# Patient Record
Sex: FEMALE | Race: WHITE | Employment: OTHER | URBAN - NONMETROPOLITAN AREA
[De-identification: names, ages, dates, MRNs, and addresses within clinical notes are randomized per-mention and may not be internally consistent; named-entity substitution may affect disease eponyms.]

---

## 2018-05-08 ENCOUNTER — OFFICE VISIT (OUTPATIENT)
Dept: OBGYN | Age: 33
End: 2018-05-08
Payer: MEDICARE

## 2018-05-08 VITALS
HEIGHT: 67 IN | BODY MASS INDEX: 37.51 KG/M2 | DIASTOLIC BLOOD PRESSURE: 82 MMHG | SYSTOLIC BLOOD PRESSURE: 126 MMHG | HEART RATE: 108 BPM | WEIGHT: 239 LBS

## 2018-05-08 DIAGNOSIS — N92.6 IRREGULAR PERIODS: ICD-10-CM

## 2018-05-08 DIAGNOSIS — N94.6 DYSMENORRHEA: ICD-10-CM

## 2018-05-08 DIAGNOSIS — Z79.4 DIABETES MELLITUS DUE TO UNDERLYING CONDITION WITH BOTH EYES AFFECTED BY MILD NONPROLIFERATIVE RETINOPATHY AND MACULAR EDEMA, WITH LONG-TERM CURRENT USE OF INSULIN (HCC): ICD-10-CM

## 2018-05-08 DIAGNOSIS — E08.3213 DIABETES MELLITUS DUE TO UNDERLYING CONDITION WITH BOTH EYES AFFECTED BY MILD NONPROLIFERATIVE RETINOPATHY AND MACULAR EDEMA, WITH LONG-TERM CURRENT USE OF INSULIN (HCC): ICD-10-CM

## 2018-05-08 DIAGNOSIS — N92.1 EXCESSIVE AND FREQUENT MENSTRUATION WITH IRREGULAR CYCLE: ICD-10-CM

## 2018-05-08 DIAGNOSIS — Z11.51 SCREENING FOR HUMAN PAPILLOMAVIRUS (HPV): ICD-10-CM

## 2018-05-08 DIAGNOSIS — Z12.4 SCREENING FOR CERVICAL CANCER: ICD-10-CM

## 2018-05-08 DIAGNOSIS — Z01.419 ENCOUNTER FOR WELL WOMAN EXAM WITH ROUTINE GYNECOLOGICAL EXAM: Primary | ICD-10-CM

## 2018-05-08 PROCEDURE — G0101 CA SCREEN;PELVIC/BREAST EXAM: HCPCS | Performed by: NURSE PRACTITIONER

## 2018-05-08 ASSESSMENT — ENCOUNTER SYMPTOMS
GASTROINTESTINAL NEGATIVE: 1
RESPIRATORY NEGATIVE: 1
EYES NEGATIVE: 1
CONSTIPATION: 0
DIARRHEA: 0
ALLERGIC/IMMUNOLOGIC NEGATIVE: 1

## 2018-05-10 DIAGNOSIS — E08.3213 DIABETES MELLITUS DUE TO UNDERLYING CONDITION WITH BOTH EYES AFFECTED BY MILD NONPROLIFERATIVE RETINOPATHY AND MACULAR EDEMA, WITH LONG-TERM CURRENT USE OF INSULIN (HCC): ICD-10-CM

## 2018-05-10 DIAGNOSIS — Z79.4 DIABETES MELLITUS DUE TO UNDERLYING CONDITION WITH BOTH EYES AFFECTED BY MILD NONPROLIFERATIVE RETINOPATHY AND MACULAR EDEMA, WITH LONG-TERM CURRENT USE OF INSULIN (HCC): ICD-10-CM

## 2018-05-10 DIAGNOSIS — N92.1 EXCESSIVE AND FREQUENT MENSTRUATION WITH IRREGULAR CYCLE: ICD-10-CM

## 2018-05-11 DIAGNOSIS — Z01.419 ENCOUNTER FOR WELL WOMAN EXAM WITH ROUTINE GYNECOLOGICAL EXAM: ICD-10-CM

## 2018-05-11 DIAGNOSIS — Z11.51 SCREENING FOR HUMAN PAPILLOMAVIRUS (HPV): ICD-10-CM

## 2018-05-11 DIAGNOSIS — Z12.4 SCREENING FOR CERVICAL CANCER: ICD-10-CM

## 2018-05-14 RX ORDER — LEVOTHYROXINE SODIUM 0.05 MG/1
50 TABLET ORAL DAILY
Qty: 30 TABLET | Refills: 1 | Status: SHIPPED | OUTPATIENT
Start: 2018-05-14

## 2018-09-14 ENCOUNTER — HOSPITAL ENCOUNTER (OUTPATIENT)
Facility: HOSPITAL | Age: 33
Setting detail: OBSERVATION
Discharge: HOME | End: 2018-09-18
Attending: EMERGENCY MEDICINE | Admitting: EMERGENCY MEDICINE

## 2018-09-14 ENCOUNTER — APPOINTMENT (OUTPATIENT)
Dept: CT IMAGING | Facility: HOSPITAL | Age: 33
End: 2018-09-14
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
## 2018-09-14 DIAGNOSIS — R74.8 ELEVATED LIPASE: ICD-10-CM

## 2018-09-14 LAB
ABO GROUP BLD: NORMAL
ALBUMIN SERPL-MCNC: 4.3 G/DL (ref 3.5–5)
ALBUMIN/GLOB SERPL: 1.3 G/DL (ref 1.1–2.5)
ALP SERPL-CCNC: 83 U/L (ref 24–120)
ALT SERPL W P-5'-P-CCNC: 35 U/L (ref 0–54)
AMPHET+METHAMPHET UR QL: NEGATIVE
AMYLASE SERPL-CCNC: 103 U/L (ref 30–110)
ANION GAP SERPL CALCULATED.3IONS-SCNC: 13 MMOL/L (ref 4–13)
AST SERPL-CCNC: 27 U/L (ref 7–45)
BARBITURATES UR QL SCN: NEGATIVE
BASOPHILS # BLD AUTO: 0.09 10*3/MM3 (ref 0–0.2)
BASOPHILS NFR BLD AUTO: 0.8 % (ref 0–2)
BENZODIAZ UR QL SCN: NEGATIVE
BILIRUB SERPL-MCNC: 0.4 MG/DL (ref 0.1–1)
BILIRUB UR QL STRIP: NEGATIVE
BLD GP AB SCN SERPL QL: NEGATIVE
BUN BLD-MCNC: 12 MG/DL (ref 5–21)
BUN/CREAT SERPL: 16.7 (ref 7–25)
CALCIUM SPEC-SCNC: 9.3 MG/DL (ref 8.4–10.4)
CANNABINOIDS SERPL QL: NEGATIVE
CHLORIDE SERPL-SCNC: 105 MMOL/L (ref 98–110)
CLARITY UR: CLEAR
CO2 SERPL-SCNC: 20 MMOL/L (ref 24–31)
COCAINE UR QL: NEGATIVE
COLOR UR: YELLOW
CREAT BLD-MCNC: 0.72 MG/DL (ref 0.5–1.4)
DEPRECATED RDW RBC AUTO: 40.2 FL (ref 40–54)
EOSINOPHIL # BLD AUTO: 0.16 10*3/MM3 (ref 0–0.7)
EOSINOPHIL NFR BLD AUTO: 1.4 % (ref 0–4)
ERYTHROCYTE [DISTWIDTH] IN BLOOD BY AUTOMATED COUNT: 12.8 % (ref 12–15)
ETHANOL UR QL: <0.01 %
GFR SERPL CREATININE-BSD FRML MDRD: 94 ML/MIN/1.73
GLOBULIN UR ELPH-MCNC: 3.2 GM/DL
GLUCOSE BLD-MCNC: 403 MG/DL (ref 70–100)
GLUCOSE BLDC GLUCOMTR-MCNC: 253 MG/DL (ref 70–130)
GLUCOSE UR STRIP-MCNC: ABNORMAL MG/DL
HCG SERPL QL: NEGATIVE
HCT VFR BLD AUTO: 41.1 % (ref 37–47)
HGB BLD-MCNC: 14.2 G/DL (ref 12–16)
HGB UR QL STRIP.AUTO: NEGATIVE
HOLD SPECIMEN: NORMAL
HOLD SPECIMEN: NORMAL
IMM GRANULOCYTES # BLD: 0.04 10*3/MM3 (ref 0–0.03)
IMM GRANULOCYTES NFR BLD: 0.3 % (ref 0–5)
INR PPP: 0.95 (ref 0.91–1.09)
KETONES UR QL STRIP: NEGATIVE
LEUKOCYTE ESTERASE UR QL STRIP.AUTO: NEGATIVE
LIPASE SERPL-CCNC: 899 U/L (ref 23–203)
LYMPHOCYTES # BLD AUTO: 2.58 10*3/MM3 (ref 0.72–4.86)
LYMPHOCYTES NFR BLD AUTO: 22.1 % (ref 15–45)
MCH RBC QN AUTO: 30 PG (ref 28–32)
MCHC RBC AUTO-ENTMCNC: 34.5 G/DL (ref 33–36)
MCV RBC AUTO: 86.7 FL (ref 82–98)
METHADONE UR QL SCN: NEGATIVE
MONOCYTES # BLD AUTO: 0.89 10*3/MM3 (ref 0.19–1.3)
MONOCYTES NFR BLD AUTO: 7.6 % (ref 4–12)
NEUTROPHILS # BLD AUTO: 7.91 10*3/MM3 (ref 1.87–8.4)
NEUTROPHILS NFR BLD AUTO: 67.8 % (ref 39–78)
NITRITE UR QL STRIP: NEGATIVE
NRBC BLD MANUAL-RTO: 0 /100 WBC (ref 0–0)
OPIATES UR QL: NEGATIVE
PCP UR QL SCN: NEGATIVE
PH UR STRIP.AUTO: 6.5 [PH] (ref 5–8)
PLATELET # BLD AUTO: 385 10*3/MM3 (ref 130–400)
PMV BLD AUTO: 10.3 FL (ref 6–12)
POTASSIUM BLD-SCNC: 4.1 MMOL/L (ref 3.5–5.3)
PROT SERPL-MCNC: 7.5 G/DL (ref 6.3–8.7)
PROT UR QL STRIP: NEGATIVE
PROTHROMBIN TIME: 13 SECONDS (ref 11.9–14.6)
RBC # BLD AUTO: 4.74 10*6/MM3 (ref 4.2–5.4)
RH BLD: POSITIVE
SODIUM BLD-SCNC: 138 MMOL/L (ref 135–145)
SP GR UR STRIP: >1.03 (ref 1–1.03)
T&S EXPIRATION DATE: NORMAL
UROBILINOGEN UR QL STRIP: ABNORMAL
WBC NRBC COR # BLD: 11.67 10*3/MM3 (ref 4.8–10.8)
WHOLE BLOOD HOLD SPECIMEN: NORMAL
WHOLE BLOOD HOLD SPECIMEN: NORMAL

## 2018-09-14 PROCEDURE — 80307 DRUG TEST PRSMV CHEM ANLYZR: CPT | Performed by: EMERGENCY MEDICINE

## 2018-09-14 PROCEDURE — 71250 CT THORAX DX C-: CPT

## 2018-09-14 PROCEDURE — 93010 ELECTROCARDIOGRAM REPORT: CPT | Performed by: INTERNAL MEDICINE

## 2018-09-14 PROCEDURE — 96361 HYDRATE IV INFUSION ADD-ON: CPT

## 2018-09-14 PROCEDURE — 82150 ASSAY OF AMYLASE: CPT | Performed by: EMERGENCY MEDICINE

## 2018-09-14 PROCEDURE — 63710000001 INSULIN LISPRO (HUMAN) PER 5 UNITS: Performed by: SPECIALIST

## 2018-09-14 PROCEDURE — G0378 HOSPITAL OBSERVATION PER HR: HCPCS

## 2018-09-14 PROCEDURE — 82962 GLUCOSE BLOOD TEST: CPT

## 2018-09-14 PROCEDURE — 80053 COMPREHEN METABOLIC PANEL: CPT | Performed by: EMERGENCY MEDICINE

## 2018-09-14 PROCEDURE — 70450 CT HEAD/BRAIN W/O DYE: CPT

## 2018-09-14 PROCEDURE — 99285 EMERGENCY DEPT VISIT HI MDM: CPT

## 2018-09-14 PROCEDURE — 83690 ASSAY OF LIPASE: CPT | Performed by: EMERGENCY MEDICINE

## 2018-09-14 PROCEDURE — 84703 CHORIONIC GONADOTROPIN ASSAY: CPT | Performed by: EMERGENCY MEDICINE

## 2018-09-14 PROCEDURE — 74176 CT ABD & PELVIS W/O CONTRAST: CPT

## 2018-09-14 PROCEDURE — 25010000002 MORPHINE SULFATE (PF) 2 MG/ML SOLUTION: Performed by: EMERGENCY MEDICINE

## 2018-09-14 PROCEDURE — 86850 RBC ANTIBODY SCREEN: CPT | Performed by: EMERGENCY MEDICINE

## 2018-09-14 PROCEDURE — 25010000002 KETOROLAC TROMETHAMINE PER 15 MG: Performed by: EMERGENCY MEDICINE

## 2018-09-14 PROCEDURE — 85610 PROTHROMBIN TIME: CPT | Performed by: EMERGENCY MEDICINE

## 2018-09-14 PROCEDURE — 96375 TX/PRO/DX INJ NEW DRUG ADDON: CPT

## 2018-09-14 PROCEDURE — 86901 BLOOD TYPING SEROLOGIC RH(D): CPT | Performed by: EMERGENCY MEDICINE

## 2018-09-14 PROCEDURE — 72125 CT NECK SPINE W/O DYE: CPT

## 2018-09-14 PROCEDURE — 96374 THER/PROPH/DIAG INJ IV PUSH: CPT

## 2018-09-14 PROCEDURE — 93005 ELECTROCARDIOGRAM TRACING: CPT | Performed by: EMERGENCY MEDICINE

## 2018-09-14 PROCEDURE — 81003 URINALYSIS AUTO W/O SCOPE: CPT | Performed by: EMERGENCY MEDICINE

## 2018-09-14 PROCEDURE — 85025 COMPLETE CBC W/AUTO DIFF WBC: CPT | Performed by: EMERGENCY MEDICINE

## 2018-09-14 PROCEDURE — 86900 BLOOD TYPING SEROLOGIC ABO: CPT | Performed by: EMERGENCY MEDICINE

## 2018-09-14 RX ORDER — MORPHINE SULFATE 2 MG/ML
2 INJECTION, SOLUTION INTRAMUSCULAR; INTRAVENOUS ONCE
Status: COMPLETED | OUTPATIENT
Start: 2018-09-14 | End: 2018-09-14

## 2018-09-14 RX ORDER — SODIUM CHLORIDE 0.9 % (FLUSH) 0.9 %
10 SYRINGE (ML) INJECTION AS NEEDED
Status: DISCONTINUED | OUTPATIENT
Start: 2018-09-14 | End: 2018-09-18 | Stop reason: HOSPADM

## 2018-09-14 RX ORDER — NICOTINE POLACRILEX 4 MG
15 LOZENGE BUCCAL
Status: DISCONTINUED | OUTPATIENT
Start: 2018-09-14 | End: 2018-09-18 | Stop reason: HOSPADM

## 2018-09-14 RX ORDER — SODIUM CHLORIDE, SODIUM LACTATE, POTASSIUM CHLORIDE, CALCIUM CHLORIDE 600; 310; 30; 20 MG/100ML; MG/100ML; MG/100ML; MG/100ML
125 INJECTION, SOLUTION INTRAVENOUS CONTINUOUS
Status: DISCONTINUED | OUTPATIENT
Start: 2018-09-14 | End: 2018-09-18 | Stop reason: HOSPADM

## 2018-09-14 RX ORDER — DEXTROSE MONOHYDRATE 25 G/50ML
25 INJECTION, SOLUTION INTRAVENOUS
Status: DISCONTINUED | OUTPATIENT
Start: 2018-09-14 | End: 2018-09-18 | Stop reason: HOSPADM

## 2018-09-14 RX ORDER — KETOROLAC TROMETHAMINE 30 MG/ML
30 INJECTION, SOLUTION INTRAMUSCULAR; INTRAVENOUS ONCE
Status: COMPLETED | OUTPATIENT
Start: 2018-09-14 | End: 2018-09-14

## 2018-09-14 RX ORDER — SODIUM CHLORIDE 9 MG/ML
125 INJECTION, SOLUTION INTRAVENOUS CONTINUOUS
Status: DISCONTINUED | OUTPATIENT
Start: 2018-09-14 | End: 2018-09-14

## 2018-09-14 RX ORDER — ACETAMINOPHEN 500 MG
1000 TABLET ORAL ONCE
Status: COMPLETED | OUTPATIENT
Start: 2018-09-14 | End: 2018-09-14

## 2018-09-14 RX ORDER — OXYCODONE HYDROCHLORIDE 5 MG/1
5 TABLET ORAL EVERY 4 HOURS PRN
Status: DISCONTINUED | OUTPATIENT
Start: 2018-09-14 | End: 2018-09-15

## 2018-09-14 RX ADMIN — OXYCODONE HYDROCHLORIDE 5 MG: 5 TABLET ORAL at 20:03

## 2018-09-14 RX ADMIN — KETOROLAC TROMETHAMINE 30 MG: 30 INJECTION, SOLUTION INTRAMUSCULAR at 19:42

## 2018-09-14 RX ADMIN — ACETAMINOPHEN 1000 MG: 500 TABLET, FILM COATED ORAL at 19:41

## 2018-09-14 RX ADMIN — SODIUM CHLORIDE, POTASSIUM CHLORIDE, SODIUM LACTATE AND CALCIUM CHLORIDE 125 ML/HR: 600; 310; 30; 20 INJECTION, SOLUTION INTRAVENOUS at 22:27

## 2018-09-14 RX ADMIN — INSULIN LISPRO 6 UNITS: 100 INJECTION, SOLUTION INTRAVENOUS; SUBCUTANEOUS at 22:38

## 2018-09-14 RX ADMIN — MORPHINE SULFATE 2 MG: 2 INJECTION, SOLUTION INTRAMUSCULAR; INTRAVENOUS at 17:52
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
## 2018-09-15 ENCOUNTER — APPOINTMENT (OUTPATIENT)
Dept: MRI IMAGING | Facility: HOSPITAL | Age: 33
End: 2018-09-15

## 2018-09-15 LAB
ALBUMIN SERPL-MCNC: 3.5 G/DL (ref 3.5–5)
ALBUMIN/GLOB SERPL: 1.3 G/DL (ref 1.1–2.5)
ALP SERPL-CCNC: 65 U/L (ref 24–120)
ALT SERPL W P-5'-P-CCNC: 35 U/L (ref 0–54)
AMYLASE SERPL-CCNC: 75 U/L (ref 30–110)
ANION GAP SERPL CALCULATED.3IONS-SCNC: 11 MMOL/L (ref 4–13)
AST SERPL-CCNC: 21 U/L (ref 7–45)
BILIRUB SERPL-MCNC: 0.3 MG/DL (ref 0.1–1)
BUN BLD-MCNC: 13 MG/DL (ref 5–21)
BUN/CREAT SERPL: 22.4 (ref 7–25)
CALCIUM SPEC-SCNC: 8.4 MG/DL (ref 8.4–10.4)
CHLORIDE SERPL-SCNC: 108 MMOL/L (ref 98–110)
CO2 SERPL-SCNC: 21 MMOL/L (ref 24–31)
CREAT BLD-MCNC: 0.58 MG/DL (ref 0.5–1.4)
DEPRECATED RDW RBC AUTO: 42 FL (ref 40–54)
ERYTHROCYTE [DISTWIDTH] IN BLOOD BY AUTOMATED COUNT: 12.8 % (ref 12–15)
GFR SERPL CREATININE-BSD FRML MDRD: 120 ML/MIN/1.73
GLOBULIN UR ELPH-MCNC: 2.8 GM/DL
GLUCOSE BLD-MCNC: 166 MG/DL (ref 70–100)
GLUCOSE BLDC GLUCOMTR-MCNC: 154 MG/DL (ref 70–130)
GLUCOSE BLDC GLUCOMTR-MCNC: 167 MG/DL (ref 70–130)
GLUCOSE BLDC GLUCOMTR-MCNC: 235 MG/DL (ref 70–130)
GLUCOSE BLDC GLUCOMTR-MCNC: 263 MG/DL (ref 70–130)
HCT VFR BLD AUTO: 37.9 % (ref 37–47)
HGB BLD-MCNC: 13 G/DL (ref 12–16)
LIPASE SERPL-CCNC: 445 U/L (ref 23–203)
MCH RBC QN AUTO: 30.7 PG (ref 28–32)
MCHC RBC AUTO-ENTMCNC: 34.3 G/DL (ref 33–36)
MCV RBC AUTO: 89.6 FL (ref 82–98)
PLATELET # BLD AUTO: 283 10*3/MM3 (ref 130–400)
PMV BLD AUTO: 10.2 FL (ref 6–12)
POTASSIUM BLD-SCNC: 3.9 MMOL/L (ref 3.5–5.3)
PROT SERPL-MCNC: 6.3 G/DL (ref 6.3–8.7)
RBC # BLD AUTO: 4.23 10*6/MM3 (ref 4.2–5.4)
SODIUM BLD-SCNC: 140 MMOL/L (ref 135–145)
WBC NRBC COR # BLD: 10.67 10*3/MM3 (ref 4.8–10.8)

## 2018-09-15 PROCEDURE — 80053 COMPREHEN METABOLIC PANEL: CPT | Performed by: SPECIALIST

## 2018-09-15 PROCEDURE — 94760 N-INVAS EAR/PLS OXIMETRY 1: CPT

## 2018-09-15 PROCEDURE — 94799 UNLISTED PULMONARY SVC/PX: CPT

## 2018-09-15 PROCEDURE — 72141 MRI NECK SPINE W/O DYE: CPT

## 2018-09-15 PROCEDURE — G0378 HOSPITAL OBSERVATION PER HR: HCPCS

## 2018-09-15 PROCEDURE — 96361 HYDRATE IV INFUSION ADD-ON: CPT

## 2018-09-15 PROCEDURE — 63710000001 INSULIN LISPRO (HUMAN) PER 5 UNITS: Performed by: SPECIALIST

## 2018-09-15 PROCEDURE — 82150 ASSAY OF AMYLASE: CPT | Performed by: SPECIALIST

## 2018-09-15 PROCEDURE — 85027 COMPLETE CBC AUTOMATED: CPT | Performed by: SPECIALIST

## 2018-09-15 PROCEDURE — 25010000002 MORPHINE PER 10 MG: Performed by: SPECIALIST

## 2018-09-15 PROCEDURE — 25010000002 KETOROLAC TROMETHAMINE PER 15 MG: Performed by: SPECIALIST

## 2018-09-15 PROCEDURE — 96375 TX/PRO/DX INJ NEW DRUG ADDON: CPT

## 2018-09-15 PROCEDURE — 82962 GLUCOSE BLOOD TEST: CPT

## 2018-09-15 PROCEDURE — 96376 TX/PRO/DX INJ SAME DRUG ADON: CPT

## 2018-09-15 PROCEDURE — 25010000002 ONDANSETRON PER 1 MG: Performed by: SPECIALIST

## 2018-09-15 PROCEDURE — 83690 ASSAY OF LIPASE: CPT | Performed by: SPECIALIST

## 2018-09-15 RX ORDER — ONDANSETRON 2 MG/ML
4 INJECTION INTRAMUSCULAR; INTRAVENOUS EVERY 6 HOURS PRN
Status: DISCONTINUED | OUTPATIENT
Start: 2018-09-15 | End: 2018-09-15

## 2018-09-15 RX ORDER — ONDANSETRON 2 MG/ML
4 INJECTION INTRAMUSCULAR; INTRAVENOUS EVERY 4 HOURS PRN
Status: DISCONTINUED | OUTPATIENT
Start: 2018-09-15 | End: 2018-09-18 | Stop reason: HOSPADM

## 2018-09-15 RX ORDER — DOCUSATE SODIUM 100 MG/1
100 CAPSULE, LIQUID FILLED ORAL 2 TIMES DAILY
Status: DISCONTINUED | OUTPATIENT
Start: 2018-09-15 | End: 2018-09-18 | Stop reason: HOSPADM

## 2018-09-15 RX ORDER — KETOROLAC TROMETHAMINE 30 MG/ML
30 INJECTION, SOLUTION INTRAMUSCULAR; INTRAVENOUS EVERY 6 HOURS PRN
Status: DISCONTINUED | OUTPATIENT
Start: 2018-09-15 | End: 2018-09-18 | Stop reason: HOSPADM

## 2018-09-15 RX ORDER — SODIUM CHLORIDE, SODIUM LACTATE, POTASSIUM CHLORIDE, CALCIUM CHLORIDE 600; 310; 30; 20 MG/100ML; MG/100ML; MG/100ML; MG/100ML
125 INJECTION, SOLUTION INTRAVENOUS CONTINUOUS
Status: DISCONTINUED | OUTPATIENT
Start: 2018-09-15 | End: 2018-09-15

## 2018-09-15 RX ADMIN — OXYCODONE HYDROCHLORIDE 5 MG: 5 TABLET ORAL at 04:57

## 2018-09-15 RX ADMIN — ONDANSETRON HYDROCHLORIDE 4 MG: 2 INJECTION, SOLUTION INTRAMUSCULAR; INTRAVENOUS at 12:21

## 2018-09-15 RX ADMIN — INSULIN LISPRO 4 UNITS: 100 INJECTION, SOLUTION INTRAVENOUS; SUBCUTANEOUS at 12:21

## 2018-09-15 RX ADMIN — INSULIN LISPRO 4 UNITS: 100 INJECTION, SOLUTION INTRAVENOUS; SUBCUTANEOUS at 08:12

## 2018-09-15 RX ADMIN — MORPHINE SULFATE 4 MG: 4 INJECTION INTRAVENOUS at 19:50

## 2018-09-15 RX ADMIN — ONDANSETRON HYDROCHLORIDE 4 MG: 2 INJECTION, SOLUTION INTRAMUSCULAR; INTRAVENOUS at 19:50

## 2018-09-15 RX ADMIN — DOCUSATE SODIUM 100 MG: 100 CAPSULE ORAL at 20:53

## 2018-09-15 RX ADMIN — INSULIN LISPRO 2 UNITS: 100 INJECTION, SOLUTION INTRAVENOUS; SUBCUTANEOUS at 19:22

## 2018-09-15 RX ADMIN — SODIUM CHLORIDE, POTASSIUM CHLORIDE, SODIUM LACTATE AND CALCIUM CHLORIDE 125 ML/HR: 600; 310; 30; 20 INJECTION, SOLUTION INTRAVENOUS at 06:42

## 2018-09-15 RX ADMIN — MORPHINE SULFATE 4 MG: 4 INJECTION INTRAVENOUS at 08:12

## 2018-09-15 RX ADMIN — KETOROLAC TROMETHAMINE 30 MG: 30 INJECTION, SOLUTION INTRAMUSCULAR; INTRAVENOUS at 12:21

## 2018-09-15 RX ADMIN — KETOROLAC TROMETHAMINE 30 MG: 30 INJECTION, SOLUTION INTRAMUSCULAR; INTRAVENOUS at 19:50

## 2018-09-15 RX ADMIN — SODIUM CHLORIDE, POTASSIUM CHLORIDE, SODIUM LACTATE AND CALCIUM CHLORIDE 125 ML/HR: 600; 310; 30; 20 INJECTION, SOLUTION INTRAVENOUS at 16:25

## 2018-09-15 RX ADMIN — DOCUSATE SODIUM 100 MG: 100 CAPSULE ORAL at 14:02

## 2018-09-15 RX ADMIN — INSULIN LISPRO 2 UNITS: 100 INJECTION, SOLUTION INTRAVENOUS; SUBCUTANEOUS at 21:00

## 2018-09-15 RX ADMIN — MORPHINE SULFATE 4 MG: 4 INJECTION INTRAVENOUS at 12:21
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
## 2018-09-15 NOTE — PLAN OF CARE
Problem: Patient Care Overview  Goal: Individualization and Mutuality  Outcome: Ongoing (interventions implemented as appropriate)   09/15/18 1727   Individualization   Patient Specific Preferences Likes to go to smoke.   Patient Specific Goals (Include Timeframe) Pain controlled with po pain med.   Patient Specific Interventions PO pain med controls pain.    Mutuality/Individual Preferences   What Anxieties, Fears, Concerns, or Questions Do You Have About Your Care? Concerned about pain in her neck   What Information Would Help Us Give You More Personalized Care? Pt has Devic's disease and has fallen at home prior to admission   How Would You and/or Your Support Person Like to Participate in Your Care? Keep pt and  informed of plan of care.   Mutuality/Individual Preferences   How to Address Anxieties/Fears Pt to use soft collar to help protect neck     Goal: Discharge Needs Assessment  Outcome: Ongoing (interventions implemented as appropriate)   09/14/18 2124 09/14/18 2128   Disability   Equipment Currently Used at Home glucometer  (uses walker and wheelchair when in a MS flare) --    Discharge Needs Assessment   Patient/Family Anticipates Transition to --  home with family   Patient/Family Anticipated Services at Transition --  none   Transportation Anticipated --  family or friend will provide       Problem: Fall Risk (Adult)  Goal: Identify Related Risk Factors and Signs and Symptoms  Outcome: Ongoing (interventions implemented as appropriate)   09/15/18 1727   Fall Risk (Adult)   Related Risk Factors (Fall Risk) fear of falling;history of falls;neuro disease/injury;environment unfamiliar   Signs and Symptoms (Fall Risk) presence of risk factors     Goal: Absence of Fall  Outcome: Ongoing (interventions implemented as appropriate)   09/15/18 1727   Fall Risk (Adult)   Absence of Fall making progress toward outcome

## 2018-09-15 NOTE — PLAN OF CARE
Problem: Pain, Acute (Adult)  Goal: Identify Related Risk Factors and Signs and Symptoms  Outcome: Ongoing (interventions implemented as appropriate)    Goal: Acceptable Pain Control/Comfort Level  Outcome: Ongoing (interventions implemented as appropriate)   09/15/18 7390   Pain, Acute (Adult)   Acceptable Pain Control/Comfort Level making progress toward outcome

## 2018-09-16 LAB
ALBUMIN SERPL-MCNC: 3.4 G/DL (ref 3.5–5)
ALBUMIN/GLOB SERPL: 1.3 G/DL (ref 1.1–2.5)
ALP SERPL-CCNC: 68 U/L (ref 24–120)
ALT SERPL W P-5'-P-CCNC: 36 U/L (ref 0–54)
AMYLASE SERPL-CCNC: 88 U/L (ref 30–110)
ANION GAP SERPL CALCULATED.3IONS-SCNC: 7 MMOL/L (ref 4–13)
AST SERPL-CCNC: 22 U/L (ref 7–45)
BILIRUB SERPL-MCNC: 0.4 MG/DL (ref 0.1–1)
BUN BLD-MCNC: 15 MG/DL (ref 5–21)
BUN/CREAT SERPL: 25.9 (ref 7–25)
CALCIUM SPEC-SCNC: 8.1 MG/DL (ref 8.4–10.4)
CHLORIDE SERPL-SCNC: 107 MMOL/L (ref 98–110)
CO2 SERPL-SCNC: 24 MMOL/L (ref 24–31)
CREAT BLD-MCNC: 0.58 MG/DL (ref 0.5–1.4)
DEPRECATED RDW RBC AUTO: 41.8 FL (ref 40–54)
ERYTHROCYTE [DISTWIDTH] IN BLOOD BY AUTOMATED COUNT: 12.8 % (ref 12–15)
GFR SERPL CREATININE-BSD FRML MDRD: 120 ML/MIN/1.73
GLOBULIN UR ELPH-MCNC: 2.7 GM/DL
GLUCOSE BLD-MCNC: 158 MG/DL (ref 70–100)
GLUCOSE BLDC GLUCOMTR-MCNC: 121 MG/DL (ref 70–130)
GLUCOSE BLDC GLUCOMTR-MCNC: 151 MG/DL (ref 70–130)
GLUCOSE BLDC GLUCOMTR-MCNC: 191 MG/DL (ref 70–130)
GLUCOSE BLDC GLUCOMTR-MCNC: 242 MG/DL (ref 70–130)
HCT VFR BLD AUTO: 37.2 % (ref 37–47)
HGB BLD-MCNC: 12.7 G/DL (ref 12–16)
LIPASE SERPL-CCNC: 375 U/L (ref 23–203)
MCH RBC QN AUTO: 30.6 PG (ref 28–32)
MCHC RBC AUTO-ENTMCNC: 34.1 G/DL (ref 33–36)
MCV RBC AUTO: 89.6 FL (ref 82–98)
PLATELET # BLD AUTO: 274 10*3/MM3 (ref 130–400)
PMV BLD AUTO: 10.1 FL (ref 6–12)
POTASSIUM BLD-SCNC: 3.6 MMOL/L (ref 3.5–5.3)
PROT SERPL-MCNC: 6.1 G/DL (ref 6.3–8.7)
RBC # BLD AUTO: 4.15 10*6/MM3 (ref 4.2–5.4)
SODIUM BLD-SCNC: 138 MMOL/L (ref 135–145)
WBC NRBC COR # BLD: 11.96 10*3/MM3 (ref 4.8–10.8)

## 2018-09-16 PROCEDURE — 85027 COMPLETE CBC AUTOMATED: CPT | Performed by: SPECIALIST

## 2018-09-16 PROCEDURE — 25010000002 ONDANSETRON PER 1 MG: Performed by: SPECIALIST

## 2018-09-16 PROCEDURE — 80053 COMPREHEN METABOLIC PANEL: CPT | Performed by: SPECIALIST

## 2018-09-16 PROCEDURE — 25010000002 ENOXAPARIN PER 10 MG: Performed by: SPECIALIST

## 2018-09-16 PROCEDURE — 96361 HYDRATE IV INFUSION ADD-ON: CPT

## 2018-09-16 PROCEDURE — 96372 THER/PROPH/DIAG INJ SC/IM: CPT

## 2018-09-16 PROCEDURE — G0378 HOSPITAL OBSERVATION PER HR: HCPCS

## 2018-09-16 PROCEDURE — 25010000002 MORPHINE PER 10 MG: Performed by: SPECIALIST

## 2018-09-16 PROCEDURE — 63710000001 INSULIN LISPRO (HUMAN) PER 5 UNITS: Performed by: SPECIALIST

## 2018-09-16 PROCEDURE — 82962 GLUCOSE BLOOD TEST: CPT

## 2018-09-16 PROCEDURE — 96376 TX/PRO/DX INJ SAME DRUG ADON: CPT

## 2018-09-16 PROCEDURE — 83690 ASSAY OF LIPASE: CPT | Performed by: SPECIALIST

## 2018-09-16 PROCEDURE — 25010000002 KETOROLAC TROMETHAMINE PER 15 MG: Performed by: SPECIALIST

## 2018-09-16 PROCEDURE — 82150 ASSAY OF AMYLASE: CPT | Performed by: SPECIALIST

## 2018-09-16 RX ADMIN — DOCUSATE SODIUM 100 MG: 100 CAPSULE ORAL at 08:35

## 2018-09-16 RX ADMIN — ENOXAPARIN SODIUM 40 MG: 40 INJECTION SUBCUTANEOUS at 08:36

## 2018-09-16 RX ADMIN — ONDANSETRON HYDROCHLORIDE 4 MG: 2 INJECTION, SOLUTION INTRAMUSCULAR; INTRAVENOUS at 23:40

## 2018-09-16 RX ADMIN — INSULIN LISPRO 4 UNITS: 100 INJECTION, SOLUTION INTRAVENOUS; SUBCUTANEOUS at 17:05

## 2018-09-16 RX ADMIN — INSULIN LISPRO 2 UNITS: 100 INJECTION, SOLUTION INTRAVENOUS; SUBCUTANEOUS at 12:08

## 2018-09-16 RX ADMIN — DOCUSATE SODIUM 100 MG: 100 CAPSULE ORAL at 21:41

## 2018-09-16 RX ADMIN — KETOROLAC TROMETHAMINE 30 MG: 30 INJECTION, SOLUTION INTRAMUSCULAR; INTRAVENOUS at 12:09

## 2018-09-16 RX ADMIN — ONDANSETRON HYDROCHLORIDE 4 MG: 2 INJECTION, SOLUTION INTRAMUSCULAR; INTRAVENOUS at 00:43

## 2018-09-16 RX ADMIN — MORPHINE SULFATE 4 MG: 4 INJECTION INTRAVENOUS at 16:54

## 2018-09-16 RX ADMIN — SODIUM CHLORIDE, POTASSIUM CHLORIDE, SODIUM LACTATE AND CALCIUM CHLORIDE 125 ML/HR: 600; 310; 30; 20 INJECTION, SOLUTION INTRAVENOUS at 08:37

## 2018-09-16 RX ADMIN — ONDANSETRON HYDROCHLORIDE 4 MG: 2 INJECTION, SOLUTION INTRAMUSCULAR; INTRAVENOUS at 05:34

## 2018-09-16 RX ADMIN — INSULIN LISPRO 2 UNITS: 100 INJECTION, SOLUTION INTRAVENOUS; SUBCUTANEOUS at 08:39

## 2018-09-16 RX ADMIN — ONDANSETRON HYDROCHLORIDE 4 MG: 2 INJECTION, SOLUTION INTRAMUSCULAR; INTRAVENOUS at 16:54

## 2018-09-16 RX ADMIN — MORPHINE SULFATE 4 MG: 4 INJECTION INTRAVENOUS at 00:43

## 2018-09-16 RX ADMIN — KETOROLAC TROMETHAMINE 30 MG: 30 INJECTION, SOLUTION INTRAMUSCULAR; INTRAVENOUS at 21:48

## 2018-09-16 RX ADMIN — MORPHINE SULFATE 4 MG: 4 INJECTION INTRAVENOUS at 19:37

## 2018-09-16 RX ADMIN — MORPHINE SULFATE 4 MG: 4 INJECTION INTRAVENOUS at 12:09

## 2018-09-16 RX ADMIN — SODIUM CHLORIDE, POTASSIUM CHLORIDE, SODIUM LACTATE AND CALCIUM CHLORIDE 125 ML/HR: 600; 310; 30; 20 INJECTION, SOLUTION INTRAVENOUS at 00:42

## 2018-09-16 RX ADMIN — MORPHINE SULFATE 4 MG: 4 INJECTION INTRAVENOUS at 05:34

## 2018-09-16 RX ADMIN — ONDANSETRON HYDROCHLORIDE 4 MG: 2 INJECTION, SOLUTION INTRAMUSCULAR; INTRAVENOUS at 19:37

## 2018-09-16 RX ADMIN — KETOROLAC TROMETHAMINE 30 MG: 30 INJECTION, SOLUTION INTRAMUSCULAR; INTRAVENOUS at 05:33

## 2018-09-16 RX ADMIN — SODIUM CHLORIDE, POTASSIUM CHLORIDE, SODIUM LACTATE AND CALCIUM CHLORIDE 125 ML/HR: 600; 310; 30; 20 INJECTION, SOLUTION INTRAVENOUS at 17:05

## 2018-09-16 RX ADMIN — MORPHINE SULFATE 4 MG: 4 INJECTION INTRAVENOUS at 23:40

## 2018-09-16 NOTE — PLAN OF CARE
Problem: Patient Care Overview  Goal: Plan of Care Review  Outcome: Ongoing (interventions implemented as appropriate)   09/16/18 1624   Coping/Psychosocial   Plan of Care Reviewed With patient   Plan of Care Review   Progress improving   OTHER   Outcome Summary Continues to wear soft cervical collar. Off the floor several times this shift. IV pain med given X 2. Lipase 375 today. Tolerated clear liquid diet.     Goal: Individualization and Mutuality  Outcome: Ongoing (interventions implemented as appropriate)   09/15/18 1727   Individualization   Patient Specific Preferences Likes to go to smoke.   Patient Specific Goals (Include Timeframe) Pain controlled with po pain med.   Patient Specific Interventions PO pain med controls pain.    Mutuality/Individual Preferences   What Anxieties, Fears, Concerns, or Questions Do You Have About Your Care? Concerned about pain in her neck   What Information Would Help Us Give You More Personalized Care? Pt has Devic's disease and has fallen at home prior to admission   How Would You and/or Your Support Person Like to Participate in Your Care? Keep pt and  informed of plan of care.   Mutuality/Individual Preferences   How to Address Anxieties/Fears Pt to use soft collar to help protect neck     Goal: Discharge Needs Assessment  Outcome: Ongoing (interventions implemented as appropriate)   09/14/18 2124 09/14/18 2128   Disability   Equipment Currently Used at Home glucometer  (uses walker and wheelchair when in a MS flare) --    Discharge Needs Assessment   Patient/Family Anticipates Transition to --  home with family   Patient/Family Anticipated Services at Transition --  none   Transportation Anticipated --  family or friend will provide       Problem: Fall Risk (Adult)  Goal: Identify Related Risk Factors and Signs and Symptoms  Outcome: Ongoing (interventions implemented as appropriate)   09/15/18 1727   Fall Risk (Adult)   Related Risk Factors (Fall Risk) fear of  falling;history of falls;neuro disease/injury;environment unfamiliar   Signs and Symptoms (Fall Risk) presence of risk factors     Goal: Absence of Fall  Outcome: Ongoing (interventions implemented as appropriate)   09/16/18 1624   Fall Risk (Adult)   Absence of Fall making progress toward outcome       Problem: Pain, Acute (Adult)  Goal: Identify Related Risk Factors and Signs and Symptoms  Outcome: Ongoing (interventions implemented as appropriate)   09/15/18 1734   Pain, Acute (Adult)   Related Risk Factors (Acute Pain) disease process;trauma   Signs and Symptoms (Acute Pain) facial mask of pain/grimace;moaning;questions meaning of pain     Goal: Acceptable Pain Control/Comfort Level  Outcome: Ongoing (interventions implemented as appropriate)   09/16/18 1624   Pain, Acute (Adult)   Acceptable Pain Control/Comfort Level making progress toward outcome

## 2018-09-16 NOTE — PLAN OF CARE
Problem: Patient Care Overview  Goal: Plan of Care Review  Outcome: Ongoing (interventions implemented as appropriate)   09/14/18 2221 09/15/18 2200 09/16/18 0247   Coping/Psychosocial   Plan of Care Reviewed With --  patient --    Plan of Care Review   Progress no change --  --    OTHER   Outcome Summary --  --  Wearing Cervical collar; pain in L flank, head and back; goes off the floor 2-3 times per my shift; taking prn pain meds i.e. morphine, zofran and toradol     Goal: Individualization and Mutuality  Outcome: Ongoing (interventions implemented as appropriate)   09/15/18 1727   Individualization   Patient Specific Preferences Likes to go to smoke.   Mutuality/Individual Preferences   What Anxieties, Fears, Concerns, or Questions Do You Have About Your Care? Concerned about pain in her neck   What Information Would Help Us Give You More Personalized Care? Pt has Devic's disease and has fallen at home prior to admission   How Would You and/or Your Support Person Like to Participate in Your Care? Keep pt and  informed of plan of care.   Mutuality/Individual Preferences   How to Address Anxieties/Fears Pt to use soft collar to help protect neck       Problem: Fall Risk (Adult)  Goal: Identify Related Risk Factors and Signs and Symptoms  Outcome: Ongoing (interventions implemented as appropriate)   09/15/18 1727   Fall Risk (Adult)   Related Risk Factors (Fall Risk) fear of falling;history of falls;neuro disease/injury;environment unfamiliar   Signs and Symptoms (Fall Risk) presence of risk factors     Goal: Absence of Fall  Outcome: Ongoing (interventions implemented as appropriate)   09/16/18 0247   Fall Risk (Adult)   Absence of Fall making progress toward outcome       Problem: Pain, Acute (Adult)  Goal: Acceptable Pain Control/Comfort Level  Outcome: Ongoing (interventions implemented as appropriate)   09/16/18 0247   Pain, Acute (Adult)   Acceptable Pain Control/Comfort Level making progress toward  outcome
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
## 2018-09-17 LAB
AMYLASE SERPL-CCNC: 74 U/L (ref 30–110)
ANION GAP SERPL CALCULATED.3IONS-SCNC: 12 MMOL/L (ref 4–13)
BUN BLD-MCNC: 10 MG/DL (ref 5–21)
BUN/CREAT SERPL: 17.9 (ref 7–25)
CALCIUM SPEC-SCNC: 8.5 MG/DL (ref 8.4–10.4)
CHLORIDE SERPL-SCNC: 104 MMOL/L (ref 98–110)
CO2 SERPL-SCNC: 25 MMOL/L (ref 24–31)
CREAT BLD-MCNC: 0.56 MG/DL (ref 0.5–1.4)
DEPRECATED RDW RBC AUTO: 40.2 FL (ref 40–54)
ERYTHROCYTE [DISTWIDTH] IN BLOOD BY AUTOMATED COUNT: 12.5 % (ref 12–15)
GFR SERPL CREATININE-BSD FRML MDRD: 125 ML/MIN/1.73
GLUCOSE BLD-MCNC: 146 MG/DL (ref 70–100)
GLUCOSE BLDC GLUCOMTR-MCNC: 159 MG/DL (ref 70–130)
GLUCOSE BLDC GLUCOMTR-MCNC: 160 MG/DL (ref 70–130)
GLUCOSE BLDC GLUCOMTR-MCNC: 184 MG/DL (ref 70–130)
GLUCOSE BLDC GLUCOMTR-MCNC: 252 MG/DL (ref 70–130)
HCT VFR BLD AUTO: 35.6 % (ref 37–47)
HGB BLD-MCNC: 12.1 G/DL (ref 12–16)
LIPASE SERPL-CCNC: 345 U/L (ref 23–203)
MCH RBC QN AUTO: 29.9 PG (ref 28–32)
MCHC RBC AUTO-ENTMCNC: 34 G/DL (ref 33–36)
MCV RBC AUTO: 87.9 FL (ref 82–98)
PLATELET # BLD AUTO: 330 10*3/MM3 (ref 130–400)
PMV BLD AUTO: 10 FL (ref 6–12)
POTASSIUM BLD-SCNC: 3.8 MMOL/L (ref 3.5–5.3)
RBC # BLD AUTO: 4.05 10*6/MM3 (ref 4.2–5.4)
SODIUM BLD-SCNC: 141 MMOL/L (ref 135–145)
WBC NRBC COR # BLD: 10.95 10*3/MM3 (ref 4.8–10.8)

## 2018-09-17 PROCEDURE — 96372 THER/PROPH/DIAG INJ SC/IM: CPT

## 2018-09-17 PROCEDURE — 25010000002 ENOXAPARIN PER 10 MG: Performed by: SPECIALIST

## 2018-09-17 PROCEDURE — 82962 GLUCOSE BLOOD TEST: CPT

## 2018-09-17 PROCEDURE — 25010000002 ONDANSETRON PER 1 MG: Performed by: SPECIALIST

## 2018-09-17 PROCEDURE — G0378 HOSPITAL OBSERVATION PER HR: HCPCS

## 2018-09-17 PROCEDURE — 83690 ASSAY OF LIPASE: CPT | Performed by: SPECIALIST

## 2018-09-17 PROCEDURE — 82150 ASSAY OF AMYLASE: CPT | Performed by: SPECIALIST

## 2018-09-17 PROCEDURE — 96376 TX/PRO/DX INJ SAME DRUG ADON: CPT

## 2018-09-17 PROCEDURE — 80048 BASIC METABOLIC PNL TOTAL CA: CPT | Performed by: SPECIALIST

## 2018-09-17 PROCEDURE — 25010000002 KETOROLAC TROMETHAMINE PER 15 MG: Performed by: SPECIALIST

## 2018-09-17 PROCEDURE — 85027 COMPLETE CBC AUTOMATED: CPT | Performed by: SPECIALIST

## 2018-09-17 PROCEDURE — 63710000001 INSULIN LISPRO (HUMAN) PER 5 UNITS: Performed by: SPECIALIST

## 2018-09-17 RX ADMIN — KETOROLAC TROMETHAMINE 30 MG: 30 INJECTION, SOLUTION INTRAMUSCULAR; INTRAVENOUS at 08:47

## 2018-09-17 RX ADMIN — INSULIN LISPRO 2 UNITS: 100 INJECTION, SOLUTION INTRAVENOUS; SUBCUTANEOUS at 08:47

## 2018-09-17 RX ADMIN — ENOXAPARIN SODIUM 40 MG: 40 INJECTION SUBCUTANEOUS at 08:47

## 2018-09-17 RX ADMIN — INSULIN LISPRO 2 UNITS: 100 INJECTION, SOLUTION INTRAVENOUS; SUBCUTANEOUS at 21:09

## 2018-09-17 RX ADMIN — DOCUSATE SODIUM 100 MG: 100 CAPSULE ORAL at 09:37

## 2018-09-17 RX ADMIN — INSULIN LISPRO 2 UNITS: 100 INJECTION, SOLUTION INTRAVENOUS; SUBCUTANEOUS at 12:56

## 2018-09-17 RX ADMIN — ONDANSETRON HYDROCHLORIDE 4 MG: 2 INJECTION, SOLUTION INTRAMUSCULAR; INTRAVENOUS at 09:37

## 2018-09-17 RX ADMIN — ONDANSETRON HYDROCHLORIDE 4 MG: 2 INJECTION, SOLUTION INTRAMUSCULAR; INTRAVENOUS at 14:39

## 2018-09-17 RX ADMIN — INSULIN LISPRO 6 UNITS: 100 INJECTION, SOLUTION INTRAVENOUS; SUBCUTANEOUS at 18:50

## 2018-09-17 RX ADMIN — DOCUSATE SODIUM 100 MG: 100 CAPSULE ORAL at 21:03

## 2018-09-17 RX ADMIN — KETOROLAC TROMETHAMINE 30 MG: 30 INJECTION, SOLUTION INTRAMUSCULAR; INTRAVENOUS at 14:39

## 2018-09-17 NOTE — PROGRESS NOTES
Annabel Hector MD FACS  Progress Note     LOS: 0 days   Patient Care Team:  Barrett Lerma MD as PCP - General  Barrett Lerma MD as PCP - Family Medicine      Subjective     Interval History:      continues to go outside and smoke.  Complains of nausea     Objective     Vital Signs  Temp:  [98 °F (36.7 °C)-98.2 °F (36.8 °C)] 98 °F (36.7 °C)  Heart Rate:  [82-93] 84  Resp:  [18] 18  BP: (126-139)/(70-86) 139/86    Physical Exam:  General appearance - alert, well appearing, and in no distress  Abdomen - soft, nondistended      Results Review:    Lab Results (last 24 hours)     Procedure Component Value Units Date/Time    Basic Metabolic Panel [[id number]]  (Abnormal) Collected:  09/17/18 0343    Specimen:  Blood Updated:  09/17/18 0435     Glucose 146 (H) mg/dL      BUN 10 mg/dL      Creatinine 0.56 mg/dL      Sodium 141 mmol/L      Potassium 3.8 mmol/L      Chloride 104 mmol/L      CO2 25.0 mmol/L      Calcium 8.5 mg/dL      eGFR Non African Amer 125 mL/min/1.73      BUN/Creatinine Ratio 17.9     Anion Gap 12.0 mmol/L     Narrative:       GFR Normal >60  Chronic Kidney Disease <60  Kidney Failure <15    Amylase [[id number]]  (Normal) Collected:  09/17/18 0343    Specimen:  Blood Updated:  09/17/18 0435     Amylase 74 U/L     Lipase [[id number]]  (Abnormal) Collected:  09/17/18 0343    Specimen:  Blood Updated:  09/17/18 0435     Lipase 345 (H) U/L     CBC (No Diff) [[id number]]  (Abnormal) Collected:  09/17/18 0343    Specimen:  Blood Updated:  09/17/18 0418     WBC 10.95 (H) 10*3/mm3      RBC 4.05 (L) 10*6/mm3      Hemoglobin 12.1 g/dL      Hematocrit 35.6 (L) %      MCV 87.9 fL      MCH 29.9 pg      MCHC 34.0 g/dL      RDW 12.5 %      RDW-SD 40.2 fl      MPV 10.0 fL      Platelets 330 10*3/mm3     POC Glucose Once [[id number]]  (Normal) Collected:  09/16/18 2016    Specimen:  Blood Updated:  09/16/18 2028     Glucose 121 mg/dL      Comment: : [id number] Lili Vila ID: [id number]       POC  Glucose Once [[id number]]  (Abnormal) Collected:  09/16/18 1655    Specimen:  Blood Updated:  09/16/18 1706     Glucose 242 (H) mg/dL      Comment: : [id number] Blankenship Krupa  LMeter ID: [id number]       POC Glucose Once [[id number]]  (Abnormal) Collected:  09/16/18 1201    Specimen:  Blood Updated:  09/16/18 1212     Glucose 151 (H) mg/dL      Comment: : [id number] [place]  LMeter ID: [id number]       POC Glucose Once [[id number]]  (Abnormal) Collected:  09/16/18 0825    Specimen:  Blood Updated:  09/16/18 0836     Glucose 191 (H) mg/dL      Comment: : [id number] Blankenship Krupa  LMeter ID: [id number]           Imaging Results (last 24 hours)     ** No results found for the last 24 hours. **            Assessment/Plan       Diet as tolerated      Annabel Hector MD  09/17/18  7:52 AM

## 2018-09-17 NOTE — PLAN OF CARE
Problem: Patient Care Overview  Goal: Plan of Care Review  Outcome: Ongoing (interventions implemented as appropriate)   09/16/18 1624 09/16/18 2200 09/17/18 0226   Coping/Psychosocial   Plan of Care Reviewed With --  patient --    Plan of Care Review   Progress improving --  --    OTHER   Outcome Summary --  --  cervical collar in place; off the floor several x's this shift; pt says she is not tolerating any liquid diet; vomiting after intake of clear liquid; upset because her daughter 15 y.o. tried to leave home last nite     Goal: Individualization and Mutuality  Outcome: Ongoing (interventions implemented as appropriate)   09/15/18 1727   Individualization   Patient Specific Preferences Likes to go to smoke.   Mutuality/Individual Preferences   What Anxieties, Fears, Concerns, or Questions Do You Have About Your Care? Concerned about pain in her neck   What Information Would Help Us Give You More Personalized Care? Pt has Devic's disease and has fallen at home prior to admission   Mutuality/Individual Preferences   How to Address Anxieties/Fears Pt to use soft collar to help protect neck       Problem: Fall Risk (Adult)  Goal: Absence of Fall  Outcome: Ongoing (interventions implemented as appropriate)   09/17/18 0226   Fall Risk (Adult)   Absence of Fall making progress toward outcome       Problem: Pain, Acute (Adult)  Goal: Identify Related Risk Factors and Signs and Symptoms  Outcome: Ongoing (interventions implemented as appropriate)   09/15/18 1734   Pain, Acute (Adult)   Related Risk Factors (Acute Pain) disease process;trauma   Signs and Symptoms (Acute Pain) facial mask of pain/grimace;moaning;questions meaning of pain     Goal: Acceptable Pain Control/Comfort Level  Outcome: Ongoing (interventions implemented as appropriate)   09/17/18 0226   Pain, Acute (Adult)   Acceptable Pain Control/Comfort Level making progress toward outcome

## 2018-09-18 VITALS
WEIGHT: 235.8 LBS | BODY MASS INDEX: 34.93 KG/M2 | OXYGEN SATURATION: 97 % | RESPIRATION RATE: 16 BRPM | HEIGHT: 69 IN | DIASTOLIC BLOOD PRESSURE: 75 MMHG | SYSTOLIC BLOOD PRESSURE: 127 MMHG | HEART RATE: 78 BPM | TEMPERATURE: 98.1 F

## 2018-09-18 LAB
ANION GAP SERPL CALCULATED.3IONS-SCNC: 11 MMOL/L (ref 4–13)
BUN BLD-MCNC: 11 MG/DL (ref 5–21)
BUN/CREAT SERPL: 18.6 (ref 7–25)
CALCIUM SPEC-SCNC: 9.1 MG/DL (ref 8.4–10.4)
CHLORIDE SERPL-SCNC: 105 MMOL/L (ref 98–110)
CO2 SERPL-SCNC: 24 MMOL/L (ref 24–31)
CREAT BLD-MCNC: 0.59 MG/DL (ref 0.5–1.4)
DEPRECATED RDW RBC AUTO: 39.8 FL (ref 40–54)
ERYTHROCYTE [DISTWIDTH] IN BLOOD BY AUTOMATED COUNT: 12.4 % (ref 12–15)
GFR SERPL CREATININE-BSD FRML MDRD: 118 ML/MIN/1.73
GLUCOSE BLD-MCNC: 157 MG/DL (ref 70–100)
GLUCOSE BLDC GLUCOMTR-MCNC: 155 MG/DL (ref 70–130)
GLUCOSE BLDC GLUCOMTR-MCNC: 264 MG/DL (ref 70–130)
HCT VFR BLD AUTO: 36.4 % (ref 37–47)
HGB BLD-MCNC: 12.4 G/DL (ref 12–16)
MCH RBC QN AUTO: 30.2 PG (ref 28–32)
MCHC RBC AUTO-ENTMCNC: 34.1 G/DL (ref 33–36)
MCV RBC AUTO: 88.8 FL (ref 82–98)
PLATELET # BLD AUTO: 319 10*3/MM3 (ref 130–400)
PMV BLD AUTO: 10.7 FL (ref 6–12)
POTASSIUM BLD-SCNC: 3.7 MMOL/L (ref 3.5–5.3)
RBC # BLD AUTO: 4.1 10*6/MM3 (ref 4.2–5.4)
SODIUM BLD-SCNC: 140 MMOL/L (ref 135–145)
WBC NRBC COR # BLD: 10.73 10*3/MM3 (ref 4.8–10.8)

## 2018-09-18 PROCEDURE — 85027 COMPLETE CBC AUTOMATED: CPT | Performed by: SPECIALIST

## 2018-09-18 PROCEDURE — 96376 TX/PRO/DX INJ SAME DRUG ADON: CPT

## 2018-09-18 PROCEDURE — 25010000002 KETOROLAC TROMETHAMINE PER 15 MG: Performed by: SPECIALIST

## 2018-09-18 PROCEDURE — 82962 GLUCOSE BLOOD TEST: CPT

## 2018-09-18 PROCEDURE — 96372 THER/PROPH/DIAG INJ SC/IM: CPT

## 2018-09-18 PROCEDURE — 25010000002 ENOXAPARIN PER 10 MG: Performed by: SPECIALIST

## 2018-09-18 PROCEDURE — G0378 HOSPITAL OBSERVATION PER HR: HCPCS

## 2018-09-18 PROCEDURE — 80048 BASIC METABOLIC PNL TOTAL CA: CPT | Performed by: SPECIALIST

## 2018-09-18 PROCEDURE — 63710000001 INSULIN LISPRO (HUMAN) PER 5 UNITS: Performed by: SPECIALIST

## 2018-09-18 RX ADMIN — INSULIN LISPRO 2 UNITS: 100 INJECTION, SOLUTION INTRAVENOUS; SUBCUTANEOUS at 12:03

## 2018-09-18 RX ADMIN — KETOROLAC TROMETHAMINE 30 MG: 30 INJECTION, SOLUTION INTRAMUSCULAR; INTRAVENOUS at 09:09

## 2018-09-18 RX ADMIN — INSULIN LISPRO 6 UNITS: 100 INJECTION, SOLUTION INTRAVENOUS; SUBCUTANEOUS at 08:53

## 2018-09-18 RX ADMIN — ENOXAPARIN SODIUM 40 MG: 40 INJECTION SUBCUTANEOUS at 08:53

## 2018-09-18 RX ADMIN — KETOROLAC TROMETHAMINE 30 MG: 30 INJECTION, SOLUTION INTRAMUSCULAR; INTRAVENOUS at 01:48

## 2018-09-18 RX ADMIN — DOCUSATE SODIUM 100 MG: 100 CAPSULE ORAL at 08:54

## 2018-09-18 NOTE — PLAN OF CARE
Problem: Patient Care Overview  Goal: Plan of Care Review  Outcome: Ongoing (interventions implemented as appropriate)   09/18/18 0723   Coping/Psychosocial   Plan of Care Reviewed With patient   Plan of Care Review   Progress improving   OTHER   Outcome Summary Lost IV refuses to have a new one started since she will be going home today; No c/o nausea; Medicated for pain x1; soft cervical collar in place
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
## 2018-09-18 NOTE — PROGRESS NOTES
Annabel Hector MD FACS  Progress Note     LOS: 0 days   Patient Care Team:  Barrett Lerma MD as PCP - General  Barrett Lerma MD as PCP - Family Medicine      Subjective     Interval History:      feeling much better.  donald po.  Decreased pain     Objective     Vital Signs  Temp:  [97.3 °F (36.3 °C)-98.6 °F (37 °C)] 97.7 °F (36.5 °C)  Heart Rate:  [78-94] 78  Resp:  [16-20] 16  BP: (125-141)/(74-94) 125/83    Physical Exam:  General appearance - alert, well appearing, and in no distress  Abdomen - softer, nontender, nondistended      Results Review:    Lab Results (last 24 hours)     Procedure Component Value Units Date/Time    Basic Metabolic Panel [[id number]]  (Abnormal) Collected:  09/18/18 0504    Specimen:  Blood Updated:  09/18/18 0636     Glucose 157 (H) mg/dL      BUN 11 mg/dL      Creatinine 0.59 mg/dL      Sodium 140 mmol/L      Potassium 3.7 mmol/L      Chloride 105 mmol/L      CO2 24.0 mmol/L      Calcium 9.1 mg/dL      eGFR Non African Amer 118 mL/min/1.73      BUN/Creatinine Ratio 18.6     Anion Gap 11.0 mmol/L     Narrative:       GFR Normal >60  Chronic Kidney Disease <60  Kidney Failure <15    CBC (No Diff) [[id number]]  (Abnormal) Collected:  09/18/18 0504    Specimen:  Blood Updated:  09/18/18 0617     WBC 10.73 10*3/mm3      RBC 4.10 (L) 10*6/mm3      Hemoglobin 12.4 g/dL      Hematocrit 36.4 (L) %      MCV 88.8 fL      MCH 30.2 pg      MCHC 34.1 g/dL      RDW 12.4 %      RDW-SD 39.8 (L) fl      MPV 10.7 fL      Platelets 319 10*3/mm3     POC Glucose Once [[id number]]  (Abnormal) Collected:  09/17/18 2105    Specimen:  Blood Updated:  09/17/18 2117     Glucose 160 (H) mg/dL      Comment: : [id number] Hossein StephanieMeter ID: [id number]       POC Glucose Once [[id number]]  (Abnormal) Collected:  09/17/18 1846    Specimen:  Blood Updated:  09/17/18 1906     Glucose 252 (H) mg/dL      Comment: : [id number] Matteo Mullen ID: [id number]       POC Glucose Once [[id number]]   (Abnormal) Collected:  09/17/18 1250    Specimen:  Blood Updated:  09/17/18 1305     Glucose 184 (H) mg/dL      Comment: : [id number] Matteo CarolMeter ID: [id number]       POC Glucose Once [[id number]]  (Abnormal) Collected:  09/17/18 0829    Specimen:  Blood Updated:  09/17/18 0842     Glucose 159 (H) mg/dL      Comment: : [id number] Matteo CarolMeter ID: [id number]           Imaging Results (last 24 hours)     ** No results found for the last 24 hours. **            Assessment/Plan       Home.  Wear collar until cervicalgia resolves      April CASTILLO Hector MD  09/18/18  8:02 AM

## 2018-10-01 ENCOUNTER — APPOINTMENT (OUTPATIENT)
Dept: LAB | Facility: HOSPITAL | Age: 33
End: 2018-10-01
Attending: SPECIALIST

## 2018-10-01 ENCOUNTER — TRANSCRIBE ORDERS (OUTPATIENT)
Dept: ADMINISTRATIVE | Facility: HOSPITAL | Age: 33
End: 2018-10-01

## 2018-10-01 DIAGNOSIS — K80.50 PANCREATITIS DUE TO COMMON BILE DUCT STONE: ICD-10-CM

## 2018-10-01 DIAGNOSIS — K85.90 PANCREATITIS DUE TO COMMON BILE DUCT STONE: ICD-10-CM
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
## 2018-10-01 LAB
ALBUMIN SERPL-MCNC: 4.3 G/DL (ref 3.5–5)
ALBUMIN/GLOB SERPL: 1.4 G/DL (ref 1.1–2.5)
ALP SERPL-CCNC: 81 U/L (ref 24–120)
ALT SERPL W P-5'-P-CCNC: 38 U/L (ref 0–54)
AMYLASE SERPL-CCNC: 76 U/L (ref 30–110)
ANION GAP SERPL CALCULATED.3IONS-SCNC: 11 MMOL/L (ref 4–13)
AST SERPL-CCNC: 28 U/L (ref 7–45)
BASOPHILS # BLD AUTO: 0.1 10*3/MM3 (ref 0–0.2)
BASOPHILS NFR BLD AUTO: 0.8 % (ref 0–2)
BILIRUB SERPL-MCNC: 0.5 MG/DL (ref 0.1–1)
BUN BLD-MCNC: 10 MG/DL (ref 5–21)
BUN/CREAT SERPL: 18.2 (ref 7–25)
CALCIUM SPEC-SCNC: 8.7 MG/DL (ref 8.4–10.4)
CHLORIDE SERPL-SCNC: 106 MMOL/L (ref 98–110)
CO2 SERPL-SCNC: 21 MMOL/L (ref 24–31)
CREAT BLD-MCNC: 0.55 MG/DL (ref 0.5–1.4)
DEPRECATED RDW RBC AUTO: 40.8 FL (ref 40–54)
EOSINOPHIL # BLD AUTO: 0.2 10*3/MM3 (ref 0–0.7)
EOSINOPHIL NFR BLD AUTO: 1.6 % (ref 0–4)
ERYTHROCYTE [DISTWIDTH] IN BLOOD BY AUTOMATED COUNT: 12.5 % (ref 12–15)
GFR SERPL CREATININE-BSD FRML MDRD: 128 ML/MIN/1.73
GLOBULIN UR ELPH-MCNC: 3.1 GM/DL
GLUCOSE BLD-MCNC: 237 MG/DL (ref 70–100)
HCT VFR BLD AUTO: 40.2 % (ref 37–47)
HGB BLD-MCNC: 13.8 G/DL (ref 12–16)
IMM GRANULOCYTES # BLD: 0.05 10*3/MM3 (ref 0–0.03)
IMM GRANULOCYTES NFR BLD: 0.4 % (ref 0–5)
LIPASE SERPL-CCNC: 491 U/L (ref 23–203)
LYMPHOCYTES # BLD AUTO: 3.42 10*3/MM3 (ref 0.72–4.86)
LYMPHOCYTES NFR BLD AUTO: 27.1 % (ref 15–45)
MCH RBC QN AUTO: 30.4 PG (ref 28–32)
MCHC RBC AUTO-ENTMCNC: 34.3 G/DL (ref 33–36)
MCV RBC AUTO: 88.5 FL (ref 82–98)
MONOCYTES # BLD AUTO: 1.02 10*3/MM3 (ref 0.19–1.3)
MONOCYTES NFR BLD AUTO: 8.1 % (ref 4–12)
NEUTROPHILS # BLD AUTO: 7.82 10*3/MM3 (ref 1.87–8.4)
NEUTROPHILS NFR BLD AUTO: 62 % (ref 39–78)
NRBC BLD MANUAL-RTO: 0 /100 WBC (ref 0–0)
PLATELET # BLD AUTO: 322 10*3/MM3 (ref 130–400)
PMV BLD AUTO: 10.2 FL (ref 6–12)
POTASSIUM BLD-SCNC: 4 MMOL/L (ref 3.5–5.3)
PROT SERPL-MCNC: 7.4 G/DL (ref 6.3–8.7)
RBC # BLD AUTO: 4.54 10*6/MM3 (ref 4.2–5.4)
SODIUM BLD-SCNC: 138 MMOL/L (ref 135–145)
WBC NRBC COR # BLD: 12.61 10*3/MM3 (ref 4.8–10.8)

## 2018-10-01 PROCEDURE — 83690 ASSAY OF LIPASE: CPT | Performed by: SPECIALIST

## 2018-10-01 PROCEDURE — 82150 ASSAY OF AMYLASE: CPT | Performed by: SPECIALIST

## 2018-10-01 PROCEDURE — 85025 COMPLETE CBC W/AUTO DIFF WBC: CPT | Performed by: SPECIALIST

## 2018-10-01 PROCEDURE — 80053 COMPREHEN METABOLIC PANEL: CPT | Performed by: SPECIALIST

## 2018-10-01 PROCEDURE — 36415 COLL VENOUS BLD VENIPUNCTURE: CPT | Performed by: SPECIALIST

## 2018-10-03 ENCOUNTER — HOSPITAL ENCOUNTER (INPATIENT)
Facility: HOSPITAL | Age: 33
LOS: 7 days | Discharge: HOME | End: 2018-10-10
Attending: SPECIALIST | Admitting: SPECIALIST

## 2018-10-03 RX ORDER — ONDANSETRON 2 MG/ML
4 INJECTION INTRAMUSCULAR; INTRAVENOUS EVERY 4 HOURS PRN
Status: DISCONTINUED | OUTPATIENT
Start: 2018-10-03 | End: 2018-10-10 | Stop reason: HOSPADM

## 2018-10-03 RX ORDER — SODIUM CHLORIDE, SODIUM LACTATE, POTASSIUM CHLORIDE, CALCIUM CHLORIDE 600; 310; 30; 20 MG/100ML; MG/100ML; MG/100ML; MG/100ML
75 INJECTION, SOLUTION INTRAVENOUS CONTINUOUS
Status: DISCONTINUED | OUTPATIENT
Start: 2018-10-04 | End: 2018-10-10 | Stop reason: HOSPADM

## 2018-10-03 RX ORDER — FAMOTIDINE 10 MG/ML
20 INJECTION, SOLUTION INTRAVENOUS EVERY 12 HOURS SCHEDULED
Status: DISCONTINUED | OUTPATIENT
Start: 2018-10-04 | End: 2018-10-07

## 2018-10-04 ENCOUNTER — APPOINTMENT (OUTPATIENT)
Dept: CT IMAGING | Facility: HOSPITAL | Age: 33
End: 2018-10-04

## 2018-10-04 PROBLEM — K85.90 PANCREATITIS: Status: ACTIVE | Noted: 2018-10-04

## 2018-10-04 LAB
ALBUMIN SERPL-MCNC: 3.7 G/DL (ref 3.5–5)
ALBUMIN/GLOB SERPL: 1.3 G/DL (ref 1.1–2.5)
ALP SERPL-CCNC: 77 U/L (ref 24–120)
ALT SERPL W P-5'-P-CCNC: 33 U/L (ref 0–54)
AMYLASE SERPL-CCNC: 106 U/L (ref 30–110)
ANION GAP SERPL CALCULATED.3IONS-SCNC: 12 MMOL/L (ref 4–13)
AST SERPL-CCNC: 21 U/L (ref 7–45)
BILIRUB SERPL-MCNC: 0.2 MG/DL (ref 0.1–1)
BUN BLD-MCNC: 15 MG/DL (ref 5–21)
BUN/CREAT SERPL: 26.8 (ref 7–25)
CALCIUM SPEC-SCNC: 8.3 MG/DL (ref 8.4–10.4)
CHLORIDE SERPL-SCNC: 107 MMOL/L (ref 98–110)
CO2 SERPL-SCNC: 20 MMOL/L (ref 24–31)
CREAT BLD-MCNC: 0.56 MG/DL (ref 0.5–1.4)
DEPRECATED RDW RBC AUTO: 41 FL (ref 40–54)
ERYTHROCYTE [DISTWIDTH] IN BLOOD BY AUTOMATED COUNT: 12.7 % (ref 12–15)
GFR SERPL CREATININE-BSD FRML MDRD: 125 ML/MIN/1.73
GLOBULIN UR ELPH-MCNC: 2.9 GM/DL
GLUCOSE BLD-MCNC: 233 MG/DL (ref 70–100)
GLUCOSE BLDC GLUCOMTR-MCNC: 155 MG/DL (ref 70–130)
GLUCOSE BLDC GLUCOMTR-MCNC: 158 MG/DL (ref 70–130)
GLUCOSE BLDC GLUCOMTR-MCNC: 229 MG/DL (ref 70–130)
GLUCOSE BLDC GLUCOMTR-MCNC: 236 MG/DL (ref 70–130)
HCT VFR BLD AUTO: 38.4 % (ref 37–47)
HGB BLD-MCNC: 13.1 G/DL (ref 12–16)
LIPASE SERPL-CCNC: 1060 U/L (ref 23–203)
MCH RBC QN AUTO: 30.1 PG (ref 28–32)
MCHC RBC AUTO-ENTMCNC: 34.1 G/DL (ref 33–36)
MCV RBC AUTO: 88.3 FL (ref 82–98)
PLATELET # BLD AUTO: 305 10*3/MM3 (ref 130–400)
PMV BLD AUTO: 9.8 FL (ref 6–12)
POTASSIUM BLD-SCNC: 3.8 MMOL/L (ref 3.5–5.3)
PROT SERPL-MCNC: 6.6 G/DL (ref 6.3–8.7)
RBC # BLD AUTO: 4.35 10*6/MM3 (ref 4.2–5.4)
SODIUM BLD-SCNC: 139 MMOL/L (ref 135–145)
WBC NRBC COR # BLD: 12.46 10*3/MM3 (ref 4.8–10.8)

## 2018-10-04 PROCEDURE — 82150 ASSAY OF AMYLASE: CPT | Performed by: SPECIALIST

## 2018-10-04 PROCEDURE — 80053 COMPREHEN METABOLIC PANEL: CPT | Performed by: SPECIALIST

## 2018-10-04 PROCEDURE — 25010000002 IOPAMIDOL 61 % SOLUTION: Performed by: SPECIALIST

## 2018-10-04 PROCEDURE — 94799 UNLISTED PULMONARY SVC/PX: CPT

## 2018-10-04 PROCEDURE — 85027 COMPLETE CBC AUTOMATED: CPT | Performed by: SPECIALIST

## 2018-10-04 PROCEDURE — 25010000002 ONDANSETRON PER 1 MG: Performed by: SPECIALIST

## 2018-10-04 PROCEDURE — 74177 CT ABD & PELVIS W/CONTRAST: CPT

## 2018-10-04 PROCEDURE — 82962 GLUCOSE BLOOD TEST: CPT

## 2018-10-04 PROCEDURE — 63710000001 INSULIN LISPRO (HUMAN) PER 5 UNITS: Performed by: SPECIALIST

## 2018-10-04 PROCEDURE — 83690 ASSAY OF LIPASE: CPT | Performed by: SPECIALIST

## 2018-10-04 PROCEDURE — 25010000002 KETOROLAC TROMETHAMINE PER 15 MG: Performed by: SPECIALIST

## 2018-10-04 PROCEDURE — 99406 BEHAV CHNG SMOKING 3-10 MIN: CPT

## 2018-10-04 PROCEDURE — 25010000002 MORPHINE PER 10 MG: Performed by: SPECIALIST

## 2018-10-04 PROCEDURE — 25010000002 DIPHENHYDRAMINE PER 50 MG: Performed by: SPECIALIST

## 2018-10-04 RX ORDER — DIPHENHYDRAMINE HYDROCHLORIDE 50 MG/ML
25 INJECTION INTRAMUSCULAR; INTRAVENOUS ONCE
Status: COMPLETED | OUTPATIENT
Start: 2018-10-04 | End: 2018-10-04

## 2018-10-04 RX ORDER — NICOTINE POLACRILEX 4 MG
15 LOZENGE BUCCAL
Status: DISCONTINUED | OUTPATIENT
Start: 2018-10-04 | End: 2018-10-10 | Stop reason: HOSPADM

## 2018-10-04 RX ORDER — DEXTROSE MONOHYDRATE 25 G/50ML
25 INJECTION, SOLUTION INTRAVENOUS
Status: DISCONTINUED | OUTPATIENT
Start: 2018-10-04 | End: 2018-10-10 | Stop reason: HOSPADM

## 2018-10-04 RX ORDER — FLUCONAZOLE 150 MG/1
150 TABLET ORAL ONCE
Status: COMPLETED | OUTPATIENT
Start: 2018-10-04 | End: 2018-10-04

## 2018-10-04 RX ORDER — KETOROLAC TROMETHAMINE 30 MG/ML
30 INJECTION, SOLUTION INTRAMUSCULAR; INTRAVENOUS EVERY 6 HOURS PRN
Status: DISPENSED | OUTPATIENT
Start: 2018-10-04 | End: 2018-10-09

## 2018-10-04 RX ADMIN — INSULIN LISPRO 2 UNITS: 100 INJECTION, SOLUTION INTRAVENOUS; SUBCUTANEOUS at 18:08

## 2018-10-04 RX ADMIN — ONDANSETRON 4 MG: 2 INJECTION INTRAMUSCULAR; INTRAVENOUS at 09:15

## 2018-10-04 RX ADMIN — MORPHINE SULFATE 4 MG: 4 INJECTION INTRAVENOUS at 16:47

## 2018-10-04 RX ADMIN — MORPHINE SULFATE 4 MG: 4 INJECTION INTRAVENOUS at 00:12

## 2018-10-04 RX ADMIN — DIPHENHYDRAMINE HYDROCHLORIDE 25 MG: 50 INJECTION, SOLUTION INTRAMUSCULAR; INTRAVENOUS at 11:55

## 2018-10-04 RX ADMIN — FLUCONAZOLE 150 MG: 150 TABLET ORAL at 14:04

## 2018-10-04 RX ADMIN — SODIUM CHLORIDE, POTASSIUM CHLORIDE, SODIUM LACTATE AND CALCIUM CHLORIDE 125 ML/HR: 600; 310; 30; 20 INJECTION, SOLUTION INTRAVENOUS at 00:12

## 2018-10-04 RX ADMIN — MORPHINE SULFATE 4 MG: 4 INJECTION INTRAVENOUS at 04:56

## 2018-10-04 RX ADMIN — KETOROLAC TROMETHAMINE 30 MG: 30 INJECTION, SOLUTION INTRAMUSCULAR at 14:04

## 2018-10-04 RX ADMIN — FAMOTIDINE 20 MG: 10 INJECTION, SOLUTION INTRAVENOUS at 08:14

## 2018-10-04 RX ADMIN — INSULIN LISPRO 4 UNITS: 100 INJECTION, SOLUTION INTRAVENOUS; SUBCUTANEOUS at 12:29

## 2018-10-04 RX ADMIN — FAMOTIDINE 20 MG: 10 INJECTION, SOLUTION INTRAVENOUS at 20:09

## 2018-10-04 RX ADMIN — ONDANSETRON 4 MG: 2 INJECTION INTRAMUSCULAR; INTRAVENOUS at 00:12

## 2018-10-04 RX ADMIN — SODIUM CHLORIDE, POTASSIUM CHLORIDE, SODIUM LACTATE AND CALCIUM CHLORIDE 125 ML/HR: 600; 310; 30; 20 INJECTION, SOLUTION INTRAVENOUS at 18:08

## 2018-10-04 RX ADMIN — KETOROLAC TROMETHAMINE 30 MG: 30 INJECTION, SOLUTION INTRAMUSCULAR at 20:10

## 2018-10-04 RX ADMIN — INSULIN LISPRO 2 UNITS: 100 INJECTION, SOLUTION INTRAVENOUS; SUBCUTANEOUS at 20:09

## 2018-10-04 RX ADMIN — IOPAMIDOL 100 ML: 612 INJECTION, SOLUTION INTRAVENOUS at 13:21

## 2018-10-04 RX ADMIN — FAMOTIDINE 20 MG: 10 INJECTION, SOLUTION INTRAVENOUS at 00:12

## 2018-10-04 RX ADMIN — MORPHINE SULFATE 4 MG: 4 INJECTION INTRAVENOUS at 09:15

## 2018-10-04 RX ADMIN — ONDANSETRON 4 MG: 2 INJECTION INTRAMUSCULAR; INTRAVENOUS at 04:56

## 2018-10-04 NOTE — PROGRESS NOTES
Annabel Hector MD FACS  Progress Note     LOS: 1 day   Patient Care Team:  Barrett Lerma MD as PCP - General  Barrett Lerma MD as PCP - Family Medicine      Subjective     Interval History:      no nausea.  Continues to complain of epigastric and back pain     Objective     Vital Signs  Temp:  [97.9 °F (36.6 °C)-98.2 °F (36.8 °C)] 97.9 °F (36.6 °C)  Heart Rate:  [78-88] 88  Resp:  [16-18] 16  BP: (102-130)/(58-77) 102/58    Physical Exam:  General appearance - alert, well appearing, and in no distress  Abdomen - soft, nondistended, diffusely tender, nonperitoneal      Results Review:    Lab Results (last 24 hours)     Procedure Component Value Units Date/Time    POC Glucose Once [[id number]]  (Abnormal) Collected:  10/04/18 0732    Specimen:  Blood Updated:  10/04/18 0753     Glucose 236 (H) mg/dL      Comment: : [id number] Almeida ID: [id number]       Comprehensive Metabolic Panel [[id number]]  (Abnormal) Collected:  10/04/18 0428    Specimen:  Blood Updated:  10/04/18 0644     Glucose 233 (H) mg/dL      BUN 15 mg/dL      Creatinine 0.56 mg/dL      Sodium 139 mmol/L      Potassium 3.8 mmol/L      Chloride 107 mmol/L      CO2 20.0 (L) mmol/L      Calcium 8.3 (L) mg/dL      Total Protein 6.6 g/dL      Albumin 3.70 g/dL      ALT (SGPT) 33 U/L      AST (SGOT) 21 U/L      Alkaline Phosphatase 77 U/L      Total Bilirubin 0.2 mg/dL      eGFR Non African Amer 125 mL/min/1.73      Globulin 2.9 gm/dL      A/G Ratio 1.3 g/dL      BUN/Creatinine Ratio 26.8 (H)     Anion Gap 12.0 mmol/L     Amylase [[id number]]  (Normal) Collected:  10/04/18 0428    Specimen:  Blood Updated:  10/04/18 0608     Amylase 106 U/L     Lipase [[id number]]  (Abnormal) Collected:  10/04/18 0428    Specimen:  Blood Updated:  10/04/18 0608     Lipase 1,060 (H) U/L     CBC (No Diff) [[id number]]  (Abnormal) Collected:  10/04/18 0428    Specimen:  Blood Updated:  10/04/18 0444     WBC 12.46 (H) 10*3/mm3      RBC 4.35 10*6/mm3       Hemoglobin 13.1 g/dL      Hematocrit 38.4 %      MCV 88.3 fL      MCH 30.1 pg      MCHC 34.1 g/dL      RDW 12.7 %      RDW-SD 41.0 fl      MPV 9.8 fL      Platelets 305 10*3/mm3         Imaging Results (last 24 hours)     ** No results found for the last 24 hours. **            Assessment/Plan       Pancreatitis.  Await ct.      Annabel Hector MD  10/04/18  10:34 AM

## 2018-10-05 LAB
ALBUMIN SERPL-MCNC: 3.3 G/DL (ref 3.5–5)
ALBUMIN/GLOB SERPL: 1.2 G/DL (ref 1.1–2.5)
ALP SERPL-CCNC: 64 U/L (ref 24–120)
ALT SERPL W P-5'-P-CCNC: 36 U/L (ref 0–54)
AMYLASE SERPL-CCNC: 64 U/L (ref 30–110)
ANION GAP SERPL CALCULATED.3IONS-SCNC: 9 MMOL/L (ref 4–13)
AST SERPL-CCNC: 23 U/L (ref 7–45)
BILIRUB SERPL-MCNC: 0.3 MG/DL (ref 0.1–1)
BUN BLD-MCNC: 13 MG/DL (ref 5–21)
BUN/CREAT SERPL: 21.3 (ref 7–25)
CALCIUM SPEC-SCNC: 8.4 MG/DL (ref 8.4–10.4)
CHLORIDE SERPL-SCNC: 107 MMOL/L (ref 98–110)
CO2 SERPL-SCNC: 23 MMOL/L (ref 24–31)
CREAT BLD-MCNC: 0.61 MG/DL (ref 0.5–1.4)
DEPRECATED RDW RBC AUTO: 40.5 FL (ref 40–54)
ERYTHROCYTE [DISTWIDTH] IN BLOOD BY AUTOMATED COUNT: 12.8 % (ref 12–15)
GFR SERPL CREATININE-BSD FRML MDRD: 114 ML/MIN/1.73
GLOBULIN UR ELPH-MCNC: 2.7 GM/DL
GLUCOSE BLD-MCNC: 131 MG/DL (ref 70–100)
GLUCOSE BLDC GLUCOMTR-MCNC: 134 MG/DL (ref 70–130)
GLUCOSE BLDC GLUCOMTR-MCNC: 138 MG/DL (ref 70–130)
GLUCOSE BLDC GLUCOMTR-MCNC: 138 MG/DL (ref 70–130)
GLUCOSE BLDC GLUCOMTR-MCNC: 151 MG/DL (ref 70–130)
HCT VFR BLD AUTO: 35 % (ref 37–47)
HGB BLD-MCNC: 11.9 G/DL (ref 12–16)
LIPASE SERPL-CCNC: 380 U/L (ref 23–203)
MCH RBC QN AUTO: 29.6 PG (ref 28–32)
MCHC RBC AUTO-ENTMCNC: 34 G/DL (ref 33–36)
MCV RBC AUTO: 87.1 FL (ref 82–98)
PLATELET # BLD AUTO: 311 10*3/MM3 (ref 130–400)
PMV BLD AUTO: 9.8 FL (ref 6–12)
POTASSIUM BLD-SCNC: 3.6 MMOL/L (ref 3.5–5.3)
PROT SERPL-MCNC: 6 G/DL (ref 6.3–8.7)
RBC # BLD AUTO: 4.02 10*6/MM3 (ref 4.2–5.4)
SODIUM BLD-SCNC: 139 MMOL/L (ref 135–145)
WBC NRBC COR # BLD: 12.22 10*3/MM3 (ref 4.8–10.8)

## 2018-10-05 PROCEDURE — 25010000002 MORPHINE PER 10 MG: Performed by: SPECIALIST

## 2018-10-05 PROCEDURE — 25010000002 KETOROLAC TROMETHAMINE PER 15 MG: Performed by: SPECIALIST

## 2018-10-05 PROCEDURE — 83690 ASSAY OF LIPASE: CPT | Performed by: SPECIALIST

## 2018-10-05 PROCEDURE — 85027 COMPLETE CBC AUTOMATED: CPT | Performed by: SPECIALIST

## 2018-10-05 PROCEDURE — 82962 GLUCOSE BLOOD TEST: CPT

## 2018-10-05 PROCEDURE — 80053 COMPREHEN METABOLIC PANEL: CPT | Performed by: SPECIALIST

## 2018-10-05 PROCEDURE — 25010000002 ENOXAPARIN PER 10 MG: Performed by: SPECIALIST

## 2018-10-05 PROCEDURE — 82150 ASSAY OF AMYLASE: CPT | Performed by: SPECIALIST

## 2018-10-05 RX ADMIN — KETOROLAC TROMETHAMINE 30 MG: 30 INJECTION, SOLUTION INTRAMUSCULAR at 14:45

## 2018-10-05 RX ADMIN — ENOXAPARIN SODIUM 40 MG: 40 INJECTION SUBCUTANEOUS at 08:15

## 2018-10-05 RX ADMIN — KETOROLAC TROMETHAMINE 30 MG: 30 INJECTION, SOLUTION INTRAMUSCULAR at 20:58

## 2018-10-05 RX ADMIN — KETOROLAC TROMETHAMINE 30 MG: 30 INJECTION, SOLUTION INTRAMUSCULAR at 02:11

## 2018-10-05 RX ADMIN — FAMOTIDINE 20 MG: 10 INJECTION, SOLUTION INTRAVENOUS at 20:58

## 2018-10-05 RX ADMIN — MORPHINE SULFATE 4 MG: 4 INJECTION INTRAVENOUS at 17:49

## 2018-10-05 RX ADMIN — SODIUM CHLORIDE, POTASSIUM CHLORIDE, SODIUM LACTATE AND CALCIUM CHLORIDE 125 ML/HR: 600; 310; 30; 20 INJECTION, SOLUTION INTRAVENOUS at 02:54

## 2018-10-05 RX ADMIN — SODIUM CHLORIDE, POTASSIUM CHLORIDE, SODIUM LACTATE AND CALCIUM CHLORIDE 125 ML/HR: 600; 310; 30; 20 INJECTION, SOLUTION INTRAVENOUS at 10:43

## 2018-10-05 RX ADMIN — SODIUM CHLORIDE, POTASSIUM CHLORIDE, SODIUM LACTATE AND CALCIUM CHLORIDE 125 ML/HR: 600; 310; 30; 20 INJECTION, SOLUTION INTRAVENOUS at 19:39

## 2018-10-05 RX ADMIN — MORPHINE SULFATE 4 MG: 4 INJECTION INTRAVENOUS at 11:39

## 2018-10-05 RX ADMIN — FAMOTIDINE 20 MG: 10 INJECTION, SOLUTION INTRAVENOUS at 08:15

## 2018-10-05 RX ADMIN — KETOROLAC TROMETHAMINE 30 MG: 30 INJECTION, SOLUTION INTRAMUSCULAR at 08:15
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
## 2018-10-05 NOTE — PLAN OF CARE
Problem: Patient Care Overview  Goal: Plan of Care Review  Outcome: Ongoing (interventions implemented as appropriate)   10/05/18 9311   Coping/Psychosocial   Plan of Care Reviewed With patient   Plan of Care Review   Progress no change   OTHER   Outcome Summary Pt up ad jose, leaving floor to smoke. Advised pt that smoking can exacerbate symptoms and that we have a no smoking policy on campus. She told me that she was leaving the property with her IV pole, walking across the street to smoke. I told her that was not allowed. I advised the pt of health risks of smoking. Pt c/o pain frequently throughout shift. Medicated with IV Toradol as ordered. VSS. Amylase and lipase trending down.        Problem: Pancreatitis, Acute/Chronic (Adult)  Goal: Signs and Symptoms of Listed Potential Problems Will be Absent, Minimized or Managed (Pancreatitis, Acute/Chronic)  Outcome: Ongoing (interventions implemented as appropriate)

## 2018-10-05 NOTE — PLAN OF CARE
Problem: Patient Care Overview  Goal: Plan of Care Review  Outcome: Ongoing (interventions implemented as appropriate)   10/05/18 1309   Coping/Psychosocial   Plan of Care Reviewed With patient   Plan of Care Review   Progress no change   OTHER   Outcome Summary PT vodiing. C/o of pain. Pain meds given PRN. Up ad jose. NPO. IVF cont. Ambulating in [place]. Will cont to monitor.      Goal: Individualization and Mutuality  Outcome: Ongoing (interventions implemented as appropriate)    Goal: Discharge Needs Assessment  Outcome: Ongoing (interventions implemented as appropriate)      Problem: Pancreatitis, Acute/Chronic (Adult)  Goal: Signs and Symptoms of Listed Potential Problems Will be Absent, Minimized or Managed (Pancreatitis, Acute/Chronic)  Outcome: Ongoing (interventions implemented as appropriate)

## 2018-10-06 ENCOUNTER — APPOINTMENT (OUTPATIENT)
Dept: ULTRASOUND IMAGING | Facility: HOSPITAL | Age: 33
End: 2018-10-06

## 2018-10-06 LAB
DEPRECATED RDW RBC AUTO: 40.1 FL (ref 40–54)
ERYTHROCYTE [DISTWIDTH] IN BLOOD BY AUTOMATED COUNT: 12.7 % (ref 12–15)
GLUCOSE BLDC GLUCOMTR-MCNC: 137 MG/DL (ref 70–130)
GLUCOSE BLDC GLUCOMTR-MCNC: 161 MG/DL (ref 70–130)
GLUCOSE BLDC GLUCOMTR-MCNC: 173 MG/DL (ref 70–130)
GLUCOSE BLDC GLUCOMTR-MCNC: 200 MG/DL (ref 70–130)
HCT VFR BLD AUTO: 34.7 % (ref 37–47)
HGB BLD-MCNC: 12 G/DL (ref 12–16)
MCH RBC QN AUTO: 30.2 PG (ref 28–32)
MCHC RBC AUTO-ENTMCNC: 34.6 G/DL (ref 33–36)
MCV RBC AUTO: 87.2 FL (ref 82–98)
PLATELET # BLD AUTO: 328 10*3/MM3 (ref 130–400)
PMV BLD AUTO: 10.2 FL (ref 6–12)
RBC # BLD AUTO: 3.98 10*6/MM3 (ref 4.2–5.4)
WBC NRBC COR # BLD: 10 10*3/MM3 (ref 4.8–10.8)

## 2018-10-06 PROCEDURE — 25010000002 KETOROLAC TROMETHAMINE PER 15 MG: Performed by: SPECIALIST

## 2018-10-06 PROCEDURE — 85027 COMPLETE CBC AUTOMATED: CPT | Performed by: SPECIALIST

## 2018-10-06 PROCEDURE — 63710000001 INSULIN LISPRO (HUMAN) PER 5 UNITS: Performed by: SPECIALIST

## 2018-10-06 PROCEDURE — 82962 GLUCOSE BLOOD TEST: CPT

## 2018-10-06 PROCEDURE — 25010000002 ENOXAPARIN PER 10 MG: Performed by: SPECIALIST

## 2018-10-06 PROCEDURE — 76705 ECHO EXAM OF ABDOMEN: CPT

## 2018-10-06 PROCEDURE — 25010000002 ONDANSETRON PER 1 MG: Performed by: SPECIALIST

## 2018-10-06 PROCEDURE — 25010000002 MORPHINE PER 10 MG: Performed by: SPECIALIST

## 2018-10-06 RX ADMIN — INSULIN LISPRO 2 UNITS: 100 INJECTION, SOLUTION INTRAVENOUS; SUBCUTANEOUS at 17:51

## 2018-10-06 RX ADMIN — KETOROLAC TROMETHAMINE 30 MG: 30 INJECTION, SOLUTION INTRAMUSCULAR at 08:08

## 2018-10-06 RX ADMIN — INSULIN LISPRO 4 UNITS: 100 INJECTION, SOLUTION INTRAVENOUS; SUBCUTANEOUS at 00:24

## 2018-10-06 RX ADMIN — MORPHINE SULFATE 4 MG: 4 INJECTION INTRAVENOUS at 00:25

## 2018-10-06 RX ADMIN — MORPHINE SULFATE 4 MG: 4 INJECTION INTRAVENOUS at 05:39

## 2018-10-06 RX ADMIN — KETOROLAC TROMETHAMINE 30 MG: 30 INJECTION, SOLUTION INTRAMUSCULAR at 14:21

## 2018-10-06 RX ADMIN — INSULIN LISPRO 2 UNITS: 100 INJECTION, SOLUTION INTRAVENOUS; SUBCUTANEOUS at 05:38

## 2018-10-06 RX ADMIN — ENOXAPARIN SODIUM 40 MG: 40 INJECTION SUBCUTANEOUS at 08:08

## 2018-10-06 RX ADMIN — KETOROLAC TROMETHAMINE 30 MG: 30 INJECTION, SOLUTION INTRAMUSCULAR at 22:12

## 2018-10-06 RX ADMIN — FAMOTIDINE 20 MG: 10 INJECTION, SOLUTION INTRAVENOUS at 22:11

## 2018-10-06 RX ADMIN — FAMOTIDINE 20 MG: 10 INJECTION, SOLUTION INTRAVENOUS at 08:08

## 2018-10-06 RX ADMIN — MORPHINE SULFATE 4 MG: 4 INJECTION INTRAVENOUS at 19:54

## 2018-10-06 RX ADMIN — SODIUM CHLORIDE, POTASSIUM CHLORIDE, SODIUM LACTATE AND CALCIUM CHLORIDE 125 ML/HR: 600; 310; 30; 20 INJECTION, SOLUTION INTRAVENOUS at 11:55

## 2018-10-06 RX ADMIN — ONDANSETRON 4 MG: 2 INJECTION INTRAMUSCULAR; INTRAVENOUS at 08:08

## 2018-10-06 RX ADMIN — MORPHINE SULFATE 4 MG: 4 INJECTION INTRAVENOUS at 12:09

## 2018-10-06 RX ADMIN — SODIUM CHLORIDE, POTASSIUM CHLORIDE, SODIUM LACTATE AND CALCIUM CHLORIDE 125 ML/HR: 600; 310; 30; 20 INJECTION, SOLUTION INTRAVENOUS at 03:36

## 2018-10-06 RX ADMIN — SODIUM CHLORIDE, POTASSIUM CHLORIDE, SODIUM LACTATE AND CALCIUM CHLORIDE 125 ML/HR: 600; 310; 30; 20 INJECTION, SOLUTION INTRAVENOUS at 19:55

## 2018-10-06 NOTE — PROGRESS NOTES
Annabel Hector MD FACS  Progress Note     LOS: 3 days   Patient Care Team:  Barrett Lerma MD as PCP - General  Barrett Lerma MD as PCP - Family Medicine      Subjective     Interval History:      complains of LLQ pain today. No nausea     Objective     Vital Signs  Temp:  [97.9 °F (36.6 °C)-98.3 °F (36.8 °C)] 98.1 °F (36.7 °C)  Heart Rate:  [78-89] 87  Resp:  [14-16] 14  BP: (107-125)/(63-85) 123/72    Physical Exam:  General appearance - alert, well appearing, and in no distress  Abdomen - soft, nondistended      Results Review:    Lab Results (last 24 hours)     Procedure Component Value Units Date/Time    CBC (No Diff) [[id number]]  (Abnormal) Collected:  10/06/18 0714    Specimen:  Blood Updated:  10/06/18 0804     WBC 10.00 10*3/mm3      RBC 3.98 (L) 10*6/mm3      Hemoglobin 12.0 g/dL      Hematocrit 34.7 (L) %      MCV 87.2 fL      MCH 30.2 pg      MCHC 34.6 g/dL      RDW 12.7 %      RDW-SD 40.1 fl      MPV 10.2 fL      Platelets 328 10*3/mm3     POC Glucose Once [[id number]]  (Abnormal) Collected:  10/06/18 0535    Specimen:  Blood Updated:  10/06/18 0552     Glucose 161 (H) mg/dL      Comment: : [id number] Juancarlos KELLYeter ID: [id number]       POC Glucose Once [[id number]]  (Abnormal) Collected:  10/06/18 0020    Specimen:  Blood Updated:  10/06/18 0031     Glucose 200 (H) mg/dL      Comment: : [id number] Juancarlos KELLYeter ID: [id number]       POC Glucose Once [[id number]]  (Abnormal) Collected:  10/05/18 1748    Specimen:  Blood Updated:  10/05/18 1810     Glucose 134 (H) mg/dL      Comment: : [id number] Fortino Quezadanorah ID: [id number]       POC Glucose Once [[id number]]  (Abnormal) Collected:  10/05/18 1123    Specimen:  Blood Updated:  10/05/18 1134     Glucose 151 (H) mg/dL      Comment: : [id number] Fortino SantosLencho ID: [id number]           Imaging Results (last 24 hours)     ** No results found for the last 24 hours. **            Assessment/Plan       Start  clears as tolerated.  Check us gb      Annabel Hector MD  10/06/18  10:06 AM

## 2018-10-06 NOTE — PROGRESS NOTES
Discharge Planning Assessment  [facility]     Patient Name: Cherry Samuels  MRN: [id number]  Today's Date: 10/6/2018    Admit Date: 10/3/2018          Discharge Needs Assessment     Row Name 10/06/18 1341       Living Environment    Lives With significant other    Name(s) of Who Lives With Patient Ivan    Current Living Arrangements home/apartment/condo    Primary Care Provided by self    Provides Primary Care For no one    Family Caregiver if Needed significant other    Quality of Family Relationships helpful;involved;supportive    Able to Return to Prior Arrangements yes       Resource/Environmental Concerns    Resource/Environmental Concerns none       Transition Planning    Patient/Family Anticipates Transition to home with family    Transportation Anticipated family or friend will provide       Discharge Needs Assessment    Readmission Within the Last 30 Days no previous admission in last 30 days    Concerns to be Addressed no discharge needs identified    Equipment Currently Used at Home none    Anticipated Changes Related to Illness none    Equipment Needed After Discharge none    Discharge Coordination/Progress Pt has PCP and RX coverage.  Pt denies any dc needs.            Discharge Plan    No documentation.       Destination     No service coordination in this encounter.      Durable Medical Equipment     No service coordination in this encounter.      Dialysis/Infusion     No service coordination in this encounter.      Home Medical Care     No service coordination in this encounter.      Social Care     No service coordination in this encounter.                Demographic Summary    No documentation.           Functional Status    No documentation.           Psychosocial    No documentation.           Abuse/Neglect    No documentation.           Legal    No documentation.           Substance Abuse    No documentation.           Patient Forms    No documentation.         CELIA Ladd

## 2018-10-06 NOTE — PLAN OF CARE
Problem: Patient Care Overview  Goal: Plan of Care Review  Outcome: Ongoing (interventions implemented as appropriate)  Medicated with PRN pain meds x 2. Complained of some nausea after she drank some of her daughter's soda despite being NPO, reinforced education on diet order. Off of floor frequently   Goal: Individualization and Mutuality  Outcome: Ongoing (interventions implemented as appropriate)    Goal: Discharge Needs Assessment  Outcome: Ongoing (interventions implemented as appropriate)      Problem: Pancreatitis, Acute/Chronic (Adult)  Goal: Signs and Symptoms of Listed Potential Problems Will be Absent, Minimized or Managed (Pancreatitis, Acute/Chronic)  Outcome: Ongoing (interventions implemented as appropriate)

## 2018-10-06 NOTE — PLAN OF CARE
Problem: Patient Care Overview  Goal: Plan of Care Review  Outcome: Ongoing (interventions implemented as appropriate)   10/06/18 1438   Coping/Psychosocial   Plan of Care Reviewed With patient;spouse   Plan of Care Review   Progress no change   OTHER   Outcome Summary Patient reports constant pain however sleeping well between care after pain meds given. Patient is also off the floor to smoke several times throughout shift. Explained that we are a no smoking facility. Denies nausea. Tolerating diet, as she was advanced to clear liq diet. Ambulating well. IVF cont. Accu checks q6h w/ s/s cont for DM as well. Will cont to monitor.      Goal: Individualization and Mutuality  Outcome: Ongoing (interventions implemented as appropriate)    Goal: Discharge Needs Assessment  Outcome: Ongoing (interventions implemented as appropriate)      Problem: Pancreatitis, Acute/Chronic (Adult)  Goal: Signs and Symptoms of Listed Potential Problems Will be Absent, Minimized or Managed (Pancreatitis, Acute/Chronic)  Outcome: Ongoing (interventions implemented as appropriate)   10/06/18 3948   Goal/Outcome Evaluation   Problems Assessed (Pancreatitis) all   Problems Present (Pancreatitis) pain

## 2018-10-07 LAB
AMYLASE SERPL-CCNC: 58 U/L (ref 30–110)
DEPRECATED RDW RBC AUTO: 40.5 FL (ref 40–54)
ERYTHROCYTE [DISTWIDTH] IN BLOOD BY AUTOMATED COUNT: 12.8 % (ref 12–15)
GLUCOSE BLDC GLUCOMTR-MCNC: 139 MG/DL (ref 70–130)
GLUCOSE BLDC GLUCOMTR-MCNC: 164 MG/DL (ref 70–130)
GLUCOSE BLDC GLUCOMTR-MCNC: 172 MG/DL (ref 70–130)
GLUCOSE BLDC GLUCOMTR-MCNC: 192 MG/DL (ref 70–130)
GLUCOSE BLDC GLUCOMTR-MCNC: 199 MG/DL (ref 70–130)
HCT VFR BLD AUTO: 33.9 % (ref 37–47)
HGB BLD-MCNC: 11.7 G/DL (ref 12–16)
LIPASE SERPL-CCNC: 280 U/L (ref 23–203)
MCH RBC QN AUTO: 30.2 PG (ref 28–32)
MCHC RBC AUTO-ENTMCNC: 34.5 G/DL (ref 33–36)
MCV RBC AUTO: 87.4 FL (ref 82–98)
PLATELET # BLD AUTO: 328 10*3/MM3 (ref 130–400)
PMV BLD AUTO: 9.8 FL (ref 6–12)
RBC # BLD AUTO: 3.88 10*6/MM3 (ref 4.2–5.4)
WBC NRBC COR # BLD: 9.84 10*3/MM3 (ref 4.8–10.8)

## 2018-10-07 PROCEDURE — 83690 ASSAY OF LIPASE: CPT | Performed by: SPECIALIST

## 2018-10-07 PROCEDURE — 25010000002 KETOROLAC TROMETHAMINE PER 15 MG: Performed by: SPECIALIST

## 2018-10-07 PROCEDURE — 85027 COMPLETE CBC AUTOMATED: CPT | Performed by: SPECIALIST

## 2018-10-07 PROCEDURE — 25010000002 MORPHINE PER 10 MG: Performed by: SPECIALIST

## 2018-10-07 PROCEDURE — 25010000002 ENOXAPARIN PER 10 MG: Performed by: SPECIALIST

## 2018-10-07 PROCEDURE — 82962 GLUCOSE BLOOD TEST: CPT

## 2018-10-07 PROCEDURE — 63710000001 INSULIN LISPRO (HUMAN) PER 5 UNITS: Performed by: SPECIALIST

## 2018-10-07 PROCEDURE — 82150 ASSAY OF AMYLASE: CPT | Performed by: SPECIALIST

## 2018-10-07 RX ORDER — DOCUSATE SODIUM 100 MG/1
100 CAPSULE, LIQUID FILLED ORAL 2 TIMES DAILY
Status: DISCONTINUED | OUTPATIENT
Start: 2018-10-07 | End: 2018-10-10 | Stop reason: HOSPADM

## 2018-10-07 RX ORDER — FAMOTIDINE 20 MG/1
20 TABLET, FILM COATED ORAL 2 TIMES DAILY
Status: DISCONTINUED | OUTPATIENT
Start: 2018-10-07 | End: 2018-10-10 | Stop reason: HOSPADM

## 2018-10-07 RX ORDER — MAGNESIUM CARB/ALUMINUM HYDROX 105-160MG
150 TABLET,CHEWABLE ORAL ONCE
Status: COMPLETED | OUTPATIENT
Start: 2018-10-07 | End: 2018-10-07

## 2018-10-07 RX ADMIN — FAMOTIDINE 20 MG: 20 TABLET, FILM COATED ORAL at 20:14

## 2018-10-07 RX ADMIN — KETOROLAC TROMETHAMINE 30 MG: 30 INJECTION, SOLUTION INTRAMUSCULAR at 10:21

## 2018-10-07 RX ADMIN — SODIUM CHLORIDE, POTASSIUM CHLORIDE, SODIUM LACTATE AND CALCIUM CHLORIDE 75 ML/HR: 600; 310; 30; 20 INJECTION, SOLUTION INTRAVENOUS at 16:09

## 2018-10-07 RX ADMIN — MILK OF MAGNESIA 10 ML: 2400 CONCENTRATE ORAL at 11:25

## 2018-10-07 RX ADMIN — ENOXAPARIN SODIUM 40 MG: 40 INJECTION SUBCUTANEOUS at 08:05

## 2018-10-07 RX ADMIN — MORPHINE SULFATE 4 MG: 4 INJECTION INTRAVENOUS at 01:09

## 2018-10-07 RX ADMIN — KETOROLAC TROMETHAMINE 30 MG: 30 INJECTION, SOLUTION INTRAMUSCULAR at 04:31

## 2018-10-07 RX ADMIN — Medication 150 ML: at 11:25

## 2018-10-07 RX ADMIN — INSULIN LISPRO 2 UNITS: 100 INJECTION, SOLUTION INTRAVENOUS; SUBCUTANEOUS at 00:58

## 2018-10-07 RX ADMIN — MORPHINE SULFATE 4 MG: 4 INJECTION INTRAVENOUS at 20:08

## 2018-10-07 RX ADMIN — INSULIN LISPRO 2 UNITS: 100 INJECTION, SOLUTION INTRAVENOUS; SUBCUTANEOUS at 12:00

## 2018-10-07 RX ADMIN — DOCUSATE SODIUM 100 MG: 100 CAPSULE ORAL at 12:00

## 2018-10-07 RX ADMIN — DOCUSATE SODIUM 100 MG: 100 CAPSULE ORAL at 20:03

## 2018-10-07 RX ADMIN — FAMOTIDINE 20 MG: 10 INJECTION, SOLUTION INTRAVENOUS at 08:05

## 2018-10-07 RX ADMIN — INSULIN LISPRO 2 UNITS: 100 INJECTION, SOLUTION INTRAVENOUS; SUBCUTANEOUS at 18:03

## 2018-10-07 RX ADMIN — KETOROLAC TROMETHAMINE 30 MG: 30 INJECTION, SOLUTION INTRAMUSCULAR at 18:03

## 2018-10-07 RX ADMIN — SODIUM CHLORIDE, POTASSIUM CHLORIDE, SODIUM LACTATE AND CALCIUM CHLORIDE 125 ML/HR: 600; 310; 30; 20 INJECTION, SOLUTION INTRAVENOUS at 05:47

## 2018-10-07 RX ADMIN — MORPHINE SULFATE 4 MG: 4 INJECTION INTRAVENOUS at 05:47

## 2018-10-07 RX ADMIN — MORPHINE SULFATE 4 MG: 4 INJECTION INTRAVENOUS at 13:43

## 2018-10-07 NOTE — NURSING NOTE
Patient off the floor smoking. Unable to check blood glucose at this time. Dinner tray at bedside. Will cont to monitor.

## 2018-10-07 NOTE — PLAN OF CARE
Problem: Patient Care Overview  Goal: Plan of Care Review  Outcome: Ongoing (interventions implemented as appropriate)   10/07/18 1437   Coping/Psychosocial   Plan of Care Reviewed With patient   Plan of Care Review   Progress no change   OTHER   Outcome Summary Patient reports constant pain however, sleeping between care. Patient has not been off the floor today as much to smoke, as she had on previous day. Denies nausea. Tolerating diet. Advanced to full liquid diet. Bowels managed per Dr. Hector d/t lack of recent BM. Monitor for BM. Ambulating well. Will cont to monitor.      Goal: Individualization and Mutuality  Outcome: Ongoing (interventions implemented as appropriate)    Goal: Discharge Needs Assessment  Outcome: Ongoing (interventions implemented as appropriate)      Problem: Pancreatitis, Acute/Chronic (Adult)  Goal: Signs and Symptoms of Listed Potential Problems Will be Absent, Minimized or Managed (Pancreatitis, Acute/Chronic)  Outcome: Ongoing (interventions implemented as appropriate)   10/07/18 0131 10/07/18 1437   Goal/Outcome Evaluation   Problems Assessed (Pancreatitis) --  all   Problems Present (Pancreatitis) pain --

## 2018-10-07 NOTE — PROGRESS NOTES
Annabel Hector MD FACS  Progress Note     LOS: 4 days   Patient Care Team:  Barrett Lerma MD as PCP - General  Barrett Lerma MD as PCP - Family Medicine      Subjective     Interval History:      complains of some right flank pain.  donald clears     Objective     Vital Signs  Temp:  [97.9 °F (36.6 °C)-98.3 °F (36.8 °C)] 98 °F (36.7 °C)  Heart Rate:  [73-91] 74  Resp:  [14-18] 14  BP: (118-140)/(68-88) 120/78    Physical Exam:  General appearance - alert, well appearing, and in no distress  Abdomen - soft, nondistended      Results Review:    Lab Results (last 24 hours)     Procedure Component Value Units Date/Time    Lipase [[id number]]  (Abnormal) Collected:  10/07/18 0515    Specimen:  Blood Updated:  10/07/18 0613     Lipase 280 (H) U/L     Amylase [[id number]]  (Normal) Collected:  10/07/18 0515    Specimen:  Blood Updated:  10/07/18 0613     Amylase 58 U/L     CBC (No Diff) [[id number]]  (Abnormal) Collected:  10/07/18 0515    Specimen:  Blood Updated:  10/07/18 0603     WBC 9.84 10*3/mm3      RBC 3.88 (L) 10*6/mm3      Hemoglobin 11.7 (L) g/dL      Hematocrit 33.9 (L) %      MCV 87.4 fL      MCH 30.2 pg      MCHC 34.5 g/dL      RDW 12.8 %      RDW-SD 40.5 fl      MPV 9.8 fL      Platelets 328 10*3/mm3     POC Glucose Once [[id number]]  (Abnormal) Collected:  10/07/18 0546    Specimen:  Blood Updated:  10/07/18 0601     Glucose 139 (H) mg/dL      Comment: : [id number] [employer] SharonMeter ID: [id number]       POC Glucose Once [[id number]]  (Abnormal) Collected:  10/07/18 0053    Specimen:  Blood Updated:  10/07/18 0112     Glucose 199 (H) mg/dL      Comment: : [id number] Oversight SystemsonMeter ID: [id number]       POC Glucose Once [[id number]]  (Abnormal) Collected:  10/06/18 1732    Specimen:  Blood Updated:  10/06/18 1742     Glucose 173 (H) mg/dL      Comment: : [id number] Dmitry ErazoniferMeter ID: [id number]       POC Glucose Once [[id number]]  (Abnormal) Collected:  10/06/18 1202    Specimen:   Blood Updated:  10/06/18 1213     Glucose 137 (H) mg/dL      Comment: : [id number] Dmitry JenniferMeter ID: [id number]           Imaging Results (last 24 hours)     Procedure Component Value Units Date/Time    US Gallbladder [[id number]] Collected:  10/06/18 1143     Updated:  10/06/18 1148    Narrative:       EXAMINATION: US GALLBLADDER-.     HISTORY: pancreatitis     COMPARISON: CT abdomen pelvis dated 10/4/2018     PROCEDURE: Real-time sonographic evaluation of the right upper quadrant  was performed. Multiple representative images were obtained and archived  to PACS for review.     FINDINGS:     PANCREAS: Not well evaluated.     LIVER: Hepatosteatosis. The contour is smooth. There are no lesions.      GALLBLADDER AND BILE DUCTS: The gallbladder is not distended. There are  no gallstones. The gallbladder wall measures 3 mm in thickness. There is  no pericholecystic fluid. There is no intra-or extrahepatic biliary  ductal dilatation. The common bile duct is non-dilated and measures 3  millimeters .     RIGHT KIDNEY: The right kidney is unremarkable in appearance .       Impression:       No cholelithiasis. No acute cholecystitis.        This report was finalized on 10/06/2018 11:45 by Dr. Linn Estevez MD.            Assessment/Plan       Fulls as tolerated.  Stool softeners      Annabel Hector MD  10/07/18  11:13 AM

## 2018-10-07 NOTE — PLAN OF CARE
Problem: Patient Care Overview  Goal: Plan of Care Review  Outcome: Ongoing (interventions implemented as appropriate)  ivf cont. Pain always rated 8 or higher. Med with morphine and toradol iv per order. bld sugar at mn 199, covered per sliding scale. Tolerating cl liq. Ambulates off floor frequently.   10/07/18 0131   Coping/Psychosocial   Plan of Care Reviewed With patient   Plan of Care Review   Progress no change     Goal: Individualization and Mutuality  Outcome: Ongoing (interventions implemented as appropriate)    Goal: Discharge Needs Assessment  Outcome: Ongoing (interventions implemented as appropriate)      Problem: Pancreatitis, Acute/Chronic (Adult)  Goal: Signs and Symptoms of Listed Potential Problems Will be Absent, Minimized or Managed (Pancreatitis, Acute/Chronic)  Outcome: Ongoing (interventions implemented as appropriate)

## 2018-10-08 ENCOUNTER — APPOINTMENT (OUTPATIENT)
Dept: MRI IMAGING | Facility: HOSPITAL | Age: 33
End: 2018-10-08

## 2018-10-08 LAB
ALBUMIN SERPL-MCNC: 3.5 G/DL (ref 3.5–5)
ALBUMIN/GLOB SERPL: 1.4 G/DL (ref 1.1–2.5)
ALP SERPL-CCNC: 69 U/L (ref 24–120)
ALT SERPL W P-5'-P-CCNC: 31 U/L (ref 0–54)
AMYLASE SERPL-CCNC: 85 U/L (ref 30–110)
ANION GAP SERPL CALCULATED.3IONS-SCNC: 10 MMOL/L (ref 4–13)
AST SERPL-CCNC: 21 U/L (ref 7–45)
BILIRUB SERPL-MCNC: 0.3 MG/DL (ref 0.1–1)
BUN BLD-MCNC: 6 MG/DL (ref 5–21)
BUN/CREAT SERPL: 10.5 (ref 7–25)
CALCIUM SPEC-SCNC: 8.8 MG/DL (ref 8.4–10.4)
CHLORIDE SERPL-SCNC: 105 MMOL/L (ref 98–110)
CO2 SERPL-SCNC: 24 MMOL/L (ref 24–31)
CREAT BLD-MCNC: 0.57 MG/DL (ref 0.5–1.4)
DEPRECATED RDW RBC AUTO: 40.1 FL (ref 40–54)
ERYTHROCYTE [DISTWIDTH] IN BLOOD BY AUTOMATED COUNT: 12.8 % (ref 12–15)
GFR SERPL CREATININE-BSD FRML MDRD: 123 ML/MIN/1.73
GLOBULIN UR ELPH-MCNC: 2.5 GM/DL
GLUCOSE BLD-MCNC: 194 MG/DL (ref 70–100)
GLUCOSE BLDC GLUCOMTR-MCNC: 131 MG/DL (ref 70–130)
GLUCOSE BLDC GLUCOMTR-MCNC: 169 MG/DL (ref 70–130)
GLUCOSE BLDC GLUCOMTR-MCNC: 195 MG/DL (ref 70–130)
HCT VFR BLD AUTO: 34.4 % (ref 37–47)
HGB BLD-MCNC: 11.9 G/DL (ref 12–16)
LIPASE SERPL-CCNC: 714 U/L (ref 23–203)
MCH RBC QN AUTO: 30.1 PG (ref 28–32)
MCHC RBC AUTO-ENTMCNC: 34.6 G/DL (ref 33–36)
MCV RBC AUTO: 87.1 FL (ref 82–98)
PLATELET # BLD AUTO: 306 10*3/MM3 (ref 130–400)
PMV BLD AUTO: 9.8 FL (ref 6–12)
POTASSIUM BLD-SCNC: 4 MMOL/L (ref 3.5–5.3)
PROT SERPL-MCNC: 6 G/DL (ref 6.3–8.7)
RBC # BLD AUTO: 3.95 10*6/MM3 (ref 4.2–5.4)
SODIUM BLD-SCNC: 139 MMOL/L (ref 135–145)
WBC NRBC COR # BLD: 8.59 10*3/MM3 (ref 4.8–10.8)

## 2018-10-08 PROCEDURE — 85027 COMPLETE CBC AUTOMATED: CPT | Performed by: SPECIALIST

## 2018-10-08 PROCEDURE — 83690 ASSAY OF LIPASE: CPT | Performed by: SPECIALIST

## 2018-10-08 PROCEDURE — A9577 INJ MULTIHANCE: HCPCS | Performed by: SPECIALIST

## 2018-10-08 PROCEDURE — 82150 ASSAY OF AMYLASE: CPT | Performed by: SPECIALIST

## 2018-10-08 PROCEDURE — 63710000001 INSULIN LISPRO (HUMAN) PER 5 UNITS: Performed by: SPECIALIST

## 2018-10-08 PROCEDURE — 25010000002 KETOROLAC TROMETHAMINE PER 15 MG: Performed by: SPECIALIST

## 2018-10-08 PROCEDURE — 74183 MRI ABD W/O CNTR FLWD CNTR: CPT

## 2018-10-08 PROCEDURE — 25010000002 ENOXAPARIN PER 10 MG: Performed by: SPECIALIST

## 2018-10-08 PROCEDURE — 0 GADOBENATE DIMEGLUMINE 529 MG/ML SOLUTION: Performed by: SPECIALIST

## 2018-10-08 PROCEDURE — 82962 GLUCOSE BLOOD TEST: CPT

## 2018-10-08 PROCEDURE — 25010000002 MORPHINE PER 10 MG: Performed by: SPECIALIST

## 2018-10-08 PROCEDURE — 80053 COMPREHEN METABOLIC PANEL: CPT | Performed by: SPECIALIST

## 2018-10-08 RX ADMIN — SODIUM CHLORIDE, POTASSIUM CHLORIDE, SODIUM LACTATE AND CALCIUM CHLORIDE 75 ML/HR: 600; 310; 30; 20 INJECTION, SOLUTION INTRAVENOUS at 21:21

## 2018-10-08 RX ADMIN — MORPHINE SULFATE 4 MG: 4 INJECTION INTRAVENOUS at 08:30

## 2018-10-08 RX ADMIN — INSULIN LISPRO 2 UNITS: 100 INJECTION, SOLUTION INTRAVENOUS; SUBCUTANEOUS at 05:29

## 2018-10-08 RX ADMIN — KETOROLAC TROMETHAMINE 30 MG: 30 INJECTION, SOLUTION INTRAMUSCULAR at 12:08

## 2018-10-08 RX ADMIN — INSULIN LISPRO 2 UNITS: 100 INJECTION, SOLUTION INTRAVENOUS; SUBCUTANEOUS at 00:04

## 2018-10-08 RX ADMIN — FAMOTIDINE 20 MG: 20 TABLET, FILM COATED ORAL at 08:29

## 2018-10-08 RX ADMIN — MORPHINE SULFATE 4 MG: 4 INJECTION INTRAVENOUS at 21:25

## 2018-10-08 RX ADMIN — MORPHINE SULFATE 4 MG: 4 INJECTION INTRAVENOUS at 04:11

## 2018-10-08 RX ADMIN — MORPHINE SULFATE 4 MG: 4 INJECTION INTRAVENOUS at 13:51

## 2018-10-08 RX ADMIN — ENOXAPARIN SODIUM 40 MG: 40 INJECTION SUBCUTANEOUS at 08:29

## 2018-10-08 RX ADMIN — FAMOTIDINE 20 MG: 20 TABLET, FILM COATED ORAL at 21:21

## 2018-10-08 RX ADMIN — MILK OF MAGNESIA 10 ML: 2400 CONCENTRATE ORAL at 10:14

## 2018-10-08 RX ADMIN — GADOBENATE DIMEGLUMINE 10 ML: 529 INJECTION, SOLUTION INTRAVENOUS at 16:25

## 2018-10-08 RX ADMIN — MORPHINE SULFATE 4 MG: 4 INJECTION INTRAVENOUS at 18:39

## 2018-10-08 RX ADMIN — DOCUSATE SODIUM 100 MG: 100 CAPSULE ORAL at 21:21

## 2018-10-08 RX ADMIN — INSULIN LISPRO 2 UNITS: 100 INJECTION, SOLUTION INTRAVENOUS; SUBCUTANEOUS at 13:25

## 2018-10-08 RX ADMIN — MORPHINE SULFATE 4 MG: 4 INJECTION INTRAVENOUS at 00:05

## 2018-10-08 RX ADMIN — DOCUSATE SODIUM 100 MG: 100 CAPSULE ORAL at 08:29

## 2018-10-08 RX ADMIN — SODIUM CHLORIDE, POTASSIUM CHLORIDE, SODIUM LACTATE AND CALCIUM CHLORIDE 75 ML/HR: 600; 310; 30; 20 INJECTION, SOLUTION INTRAVENOUS at 05:29

## 2018-10-08 NOTE — PROGRESS NOTES
Annabel Hector MD FACS  Progress Note     LOS: 5 days   Patient Care Team:  Barrett Lerma MD as PCP - General  Barrett Lerma MD as PCP - Family Medicine      Subjective     Interval History:      lipase up with eating.  Still epigastric pain     Objective     Vital Signs  Temp:  [97.9 °F (36.6 °C)-98.3 °F (36.8 °C)] 98.2 °F (36.8 °C)  Heart Rate:  [77-95] 86  Resp:  [14-16] 16  BP: (112-125)/(64-83) 125/78    Physical Exam:  General appearance - alert, well appearing, and in no distress  Abdomen - soft, nondistended, pain unchanged      Results Review:    Lab Results (last 24 hours)     Procedure Component Value Units Date/Time    POC Glucose Once [[id number]]  (Abnormal) Collected:  10/08/18 1210    Specimen:  Blood Updated:  10/08/18 1221     Glucose 169 (H) mg/dL      Comment: : [id number] Krunal MichaleaMeter ID: [id number]       POC Glucose Once [[id number]]  (Abnormal) Collected:  10/08/18 0527    Specimen:  Blood Updated:  10/08/18 0537     Glucose 195 (H) mg/dL      Comment: : [id number] Frank JessicaMeter ID: [id number]       Comprehensive Metabolic Panel [[id number]]  (Abnormal) Collected:  10/08/18 0335    Specimen:  Blood Updated:  10/08/18 0407     Glucose 194 (H) mg/dL      BUN 6 mg/dL      Creatinine 0.57 mg/dL      Sodium 139 mmol/L      Potassium 4.0 mmol/L      Chloride 105 mmol/L      CO2 24.0 mmol/L      Calcium 8.8 mg/dL      Total Protein 6.0 (L) g/dL      Albumin 3.50 g/dL      ALT (SGPT) 31 U/L      AST (SGOT) 21 U/L      Alkaline Phosphatase 69 U/L      Total Bilirubin 0.3 mg/dL      eGFR Non African Amer 123 mL/min/1.73      Globulin 2.5 gm/dL      A/G Ratio 1.4 g/dL      BUN/Creatinine Ratio 10.5     Anion Gap 10.0 mmol/L     Amylase [[id number]]  (Normal) Collected:  10/08/18 0335    Specimen:  Blood Updated:  10/08/18 0401     Amylase 85 U/L     Lipase [[id number]]  (Abnormal) Collected:  10/08/18 0335    Specimen:  Blood Updated:  10/08/18 0401     Lipase 714 (H) U/L      CBC (No Diff) [[id number]]  (Abnormal) Collected:  10/08/18 0335    Specimen:  Blood Updated:  10/08/18 0350     WBC 8.59 10*3/mm3      RBC 3.95 (L) 10*6/mm3      Hemoglobin 11.9 (L) g/dL      Hematocrit 34.4 (L) %      MCV 87.1 fL      MCH 30.1 pg      MCHC 34.6 g/dL      RDW 12.8 %      RDW-SD 40.1 fl      MPV 9.8 fL      Platelets 306 10*3/mm3     POC Glucose Once [[id number]]  (Abnormal) Collected:  10/07/18 2328    Specimen:  Blood Updated:  10/07/18 2339     Glucose 172 (H) mg/dL      Comment: : [id number] Frank JessicaMeter ID: [id number]       POC Glucose Once [[id number]]  (Abnormal) Collected:  10/07/18 1758    Specimen:  Blood Updated:  10/07/18 1809     Glucose 192 (H) mg/dL      Comment: : [id number] Dmitry JenniferMeter ID: [id number]           Imaging Results (last 24 hours)     Procedure Component Value Units Date/Time    MRI abdomen w wo contrast mrcp [[id number]] Updated:  10/08/18 1721            Assessment/Plan       Appreciate maki Hector MD  10/08/18  5:28 PM
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
## 2018-10-08 NOTE — PLAN OF CARE
Problem: Patient Care Overview  Goal: Plan of Care Review  Outcome: Ongoing (interventions implemented as appropriate)   10/08/18 1614   Coping/Psychosocial   Plan of Care Reviewed With patient   Plan of Care Review   Progress no change   OTHER   Outcome Summary Pt. c/o pain during shift, PRN pain medication provided. Pt. stated that she vomited after breakfast, I did not witness this. BG elevated, covered per order. IVF continued. Ambulating. MRCP performed today to evaluate ductal system. Gastro consult. Will continue to monitor.      Goal: Individualization and Mutuality  Outcome: Ongoing (interventions implemented as appropriate)   10/08/18 1614   Individualization   Patient Specific Preferences None   Patient Specific Goals (Include Timeframe) Keep pain at a tolerable level    Patient Specific Interventions PRN pain medication. IVF. MRCP performed.      Goal: Discharge Needs Assessment  Outcome: Ongoing (interventions implemented as appropriate)   10/08/18 1614   Discharge Needs Assessment   Concerns to be Addressed no discharge needs identified   Patient/Family Anticipates Transition to home with family   Patient/Family Anticipated Services at Transition none   Transportation Concerns car, none   Transportation Anticipated family or friend will provide   Anticipated Changes Related to Illness none   Equipment Needed After Discharge none   Disability   Equipment Currently Used at Home none       Problem: Pancreatitis, Acute/Chronic (Adult)  Goal: Signs and Symptoms of Listed Potential Problems Will be Absent, Minimized or Managed (Pancreatitis, Acute/Chronic)   10/08/18 1614   Goal/Outcome Evaluation   Problems Assessed (Pancreatitis) all   Problems Present (Pancreatitis) nausea and vomiting;pain

## 2018-10-08 NOTE — PLAN OF CARE
Problem: Patient Care Overview  Goal: Plan of Care Review  Outcome: Ongoing (interventions implemented as appropriate)   10/07/18 1437 10/08/18 0126   Coping/Psychosocial   Plan of Care Reviewed With patient --    Plan of Care Review   Progress no change --    OTHER   Outcome Summary --  Pt with multiple c/o pain, pt had hiccups tonight for roughly 1 hr, pt reported 2 BMs however I did not witness. Otherwise, ambulating, vitals stable, BG elevated at midnight--covered per orders, will cont to monitor.      Goal: Individualization and Mutuality  Outcome: Ongoing (interventions implemented as appropriate)   10/04/18 0222   Individualization   Patient Specific Preferences none   Patient Specific Goals (Include Timeframe) The patient would like less pain.    Patient Specific Interventions PRN pain medication administered as prescribed.   Mutuality/Individual Preferences   What Anxieties, Fears, Concerns, or Questions Do You Have About Your Care? none   What Information Would Help Us Give You More Personalized Care? none   Mutuality/Individual Preferences   How to Address Anxieties/Fears none     Goal: Discharge Needs Assessment  Outcome: Ongoing (interventions implemented as appropriate)   10/04/18 0222 10/06/18 1341   Discharge Needs Assessment   Readmission Within the Last 30 Days --  no previous admission in last 30 days   Concerns to be Addressed --  no discharge needs identified   Patient/Family Anticipates Transition to --  home with family   Patient/Family Anticipated Services at Transition none --    Transportation Concerns car, none --    Transportation Anticipated --  family or friend will provide   Anticipated Changes Related to Illness --  none   Equipment Needed After Discharge --  none   Discharge Coordination/Progress --  Pt has PCP and RX coverage. Pt denies any dc needs.   Disability   Equipment Currently Used at Home --  none     Goal: Interprofessional Rounds/Family Conf  Outcome: Ongoing  (interventions implemented as appropriate)   10/08/18 0126   Interdisciplinary Rounds/Family Conf   Participants nursing;patient;physician       Problem: Pancreatitis, Acute/Chronic (Adult)  Goal: Signs and Symptoms of Listed Potential Problems Will be Absent, Minimized or Managed (Pancreatitis, Acute/Chronic)  Outcome: Ongoing (interventions implemented as appropriate)   10/07/18 0131 10/07/18 1437   Goal/Outcome Evaluation   Problems Assessed (Pancreatitis) --  all   Problems Present (Pancreatitis) pain --

## 2018-10-09 LAB
ALBUMIN SERPL-MCNC: 4 G/DL (ref 3.5–5)
ALBUMIN/GLOB SERPL: 1.3 G/DL (ref 1.1–2.5)
ALP SERPL-CCNC: 69 U/L (ref 24–120)
ALT SERPL W P-5'-P-CCNC: 40 U/L (ref 0–54)
AMYLASE SERPL-CCNC: 60 U/L (ref 30–110)
ANION GAP SERPL CALCULATED.3IONS-SCNC: 12 MMOL/L (ref 4–13)
AST SERPL-CCNC: 36 U/L (ref 7–45)
BILIRUB SERPL-MCNC: 0.4 MG/DL (ref 0.1–1)
BUN BLD-MCNC: 5 MG/DL (ref 5–21)
BUN/CREAT SERPL: 7.7 (ref 7–25)
CALCIUM SPEC-SCNC: 9 MG/DL (ref 8.4–10.4)
CHLORIDE SERPL-SCNC: 105 MMOL/L (ref 98–110)
CO2 SERPL-SCNC: 23 MMOL/L (ref 24–31)
CREAT BLD-MCNC: 0.65 MG/DL (ref 0.5–1.4)
DEPRECATED RDW RBC AUTO: 40.3 FL (ref 40–54)
ERYTHROCYTE [DISTWIDTH] IN BLOOD BY AUTOMATED COUNT: 12.8 % (ref 12–15)
GFR SERPL CREATININE-BSD FRML MDRD: 106 ML/MIN/1.73
GLOBULIN UR ELPH-MCNC: 3 GM/DL
GLUCOSE BLD-MCNC: 122 MG/DL (ref 70–100)
GLUCOSE BLDC GLUCOMTR-MCNC: 130 MG/DL (ref 70–130)
GLUCOSE BLDC GLUCOMTR-MCNC: 135 MG/DL (ref 70–130)
GLUCOSE BLDC GLUCOMTR-MCNC: 140 MG/DL (ref 70–130)
HCT VFR BLD AUTO: 38 % (ref 37–47)
HGB BLD-MCNC: 13 G/DL (ref 12–16)
LIPASE SERPL-CCNC: 138 U/L (ref 23–203)
MCH RBC QN AUTO: 29.6 PG (ref 28–32)
MCHC RBC AUTO-ENTMCNC: 34.2 G/DL (ref 33–36)
MCV RBC AUTO: 86.6 FL (ref 82–98)
PLATELET # BLD AUTO: 343 10*3/MM3 (ref 130–400)
PMV BLD AUTO: 9.6 FL (ref 6–12)
POTASSIUM BLD-SCNC: 3.9 MMOL/L (ref 3.5–5.3)
PROT SERPL-MCNC: 7 G/DL (ref 6.3–8.7)
RBC # BLD AUTO: 4.39 10*6/MM3 (ref 4.2–5.4)
SODIUM BLD-SCNC: 140 MMOL/L (ref 135–145)
WBC NRBC COR # BLD: 9.09 10*3/MM3 (ref 4.8–10.8)

## 2018-10-09 PROCEDURE — 25010000002 ENOXAPARIN PER 10 MG: Performed by: SPECIALIST

## 2018-10-09 PROCEDURE — 25010000002 ONDANSETRON PER 1 MG: Performed by: SPECIALIST

## 2018-10-09 PROCEDURE — 82962 GLUCOSE BLOOD TEST: CPT

## 2018-10-09 PROCEDURE — 80053 COMPREHEN METABOLIC PANEL: CPT | Performed by: SPECIALIST

## 2018-10-09 PROCEDURE — 25010000002 KETOROLAC TROMETHAMINE PER 15 MG: Performed by: SPECIALIST

## 2018-10-09 PROCEDURE — 85027 COMPLETE CBC AUTOMATED: CPT | Performed by: SPECIALIST

## 2018-10-09 PROCEDURE — 25010000002 MORPHINE PER 10 MG: Performed by: SPECIALIST

## 2018-10-09 PROCEDURE — 99231 SBSQ HOSP IP/OBS SF/LOW 25: CPT | Performed by: INTERNAL MEDICINE

## 2018-10-09 PROCEDURE — 83690 ASSAY OF LIPASE: CPT | Performed by: SPECIALIST

## 2018-10-09 PROCEDURE — 82150 ASSAY OF AMYLASE: CPT | Performed by: SPECIALIST

## 2018-10-09 RX ORDER — KETOROLAC TROMETHAMINE 30 MG/ML
30 INJECTION, SOLUTION INTRAMUSCULAR; INTRAVENOUS ONCE
Status: COMPLETED | OUTPATIENT
Start: 2018-10-09 | End: 2018-10-09

## 2018-10-09 RX ADMIN — DOCUSATE SODIUM 100 MG: 100 CAPSULE ORAL at 08:22

## 2018-10-09 RX ADMIN — KETOROLAC TROMETHAMINE 30 MG: 30 INJECTION, SOLUTION INTRAMUSCULAR at 10:17

## 2018-10-09 RX ADMIN — ONDANSETRON 4 MG: 2 INJECTION INTRAMUSCULAR; INTRAVENOUS at 13:29

## 2018-10-09 RX ADMIN — DOCUSATE SODIUM 100 MG: 100 CAPSULE ORAL at 21:02

## 2018-10-09 RX ADMIN — MORPHINE SULFATE 4 MG: 4 INJECTION INTRAVENOUS at 03:48

## 2018-10-09 RX ADMIN — FAMOTIDINE 20 MG: 20 TABLET, FILM COATED ORAL at 21:02

## 2018-10-09 RX ADMIN — SODIUM CHLORIDE, POTASSIUM CHLORIDE, SODIUM LACTATE AND CALCIUM CHLORIDE 75 ML/HR: 600; 310; 30; 20 INJECTION, SOLUTION INTRAVENOUS at 10:19

## 2018-10-09 RX ADMIN — MORPHINE SULFATE 4 MG: 4 INJECTION INTRAVENOUS at 07:53

## 2018-10-09 RX ADMIN — MORPHINE SULFATE 4 MG: 4 INJECTION INTRAVENOUS at 14:05

## 2018-10-09 RX ADMIN — MILK OF MAGNESIA 10 ML: 2400 CONCENTRATE ORAL at 08:22

## 2018-10-09 RX ADMIN — MORPHINE SULFATE 4 MG: 4 INJECTION INTRAVENOUS at 18:29

## 2018-10-09 RX ADMIN — FAMOTIDINE 20 MG: 20 TABLET, FILM COATED ORAL at 08:22

## 2018-10-09 RX ADMIN — ENOXAPARIN SODIUM 40 MG: 40 INJECTION SUBCUTANEOUS at 08:22

## 2018-10-09 RX ADMIN — KETOROLAC TROMETHAMINE 30 MG: 30 INJECTION, SOLUTION INTRAMUSCULAR at 00:06

## 2018-10-09 RX ADMIN — KETOROLAC TROMETHAMINE 30 MG: 30 INJECTION, SOLUTION INTRAMUSCULAR at 21:02

## 2018-10-09 NOTE — PLAN OF CARE
Problem: Patient Care Overview  Goal: Plan of Care Review  Outcome: Ongoing (interventions implemented as appropriate)   10/09/18 1545   Coping/Psychosocial   Plan of Care Reviewed With patient   Plan of Care Review   Progress no change   OTHER   Outcome Summary Patient c/o of pain and nausea. PRN pain and nausea medication provided. Patient stated that eating made her nausea worse. Regular/GI soft/bland/low fat diet. Pt. tolerating diet fairly well, although she did have some nausea after eating. Will continue to monitor.      Goal: Individualization and Mutuality  Outcome: Ongoing (interventions implemented as appropriate)   10/09/18 1545   Individualization   Patient Specific Preferences Keep door closed    Patient Specific Goals (Include Timeframe) Keep pain at a tolerable level    Patient Specific Interventions PRN pain medication. IVF. Encouraged ambulation.      Goal: Discharge Needs Assessment  Outcome: Ongoing (interventions implemented as appropriate)   10/09/18 1545   Discharge Needs Assessment   Readmission Within the Last 30 Days no previous admission in last 30 days   Concerns to be Addressed no discharge needs identified   Patient/Family Anticipates Transition to home with family   Patient/Family Anticipated Services at Transition none   Transportation Concerns car, none   Transportation Anticipated family or friend will provide   Anticipated Changes Related to Illness none   Equipment Needed After Discharge none   Disability   Equipment Currently Used at Home none       Problem: Pancreatitis, Acute/Chronic (Adult)  Goal: Signs and Symptoms of Listed Potential Problems Will be Absent, Minimized or Managed (Pancreatitis, Acute/Chronic)   10/09/18 1545   Goal/Outcome Evaluation   Problems Assessed (Pancreatitis) all   Problems Present (Pancreatitis) nausea and vomiting;pain

## 2018-10-09 NOTE — PLAN OF CARE
Problem: Patient Care Overview  Goal: Plan of Care Review  Outcome: Ongoing (interventions implemented as appropriate)   10/09/18 0328   Coping/Psychosocial   Plan of Care Reviewed With patient   Plan of Care Review   Progress no change   OTHER   Outcome Summary Medicated for pain X2 so far this shift; No c/o nausea; Off the floor frequently; Accu checks q6 no coverage needed at midnight; IVF
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
## 2018-10-10 VITALS
BODY MASS INDEX: 33.12 KG/M2 | TEMPERATURE: 98.1 F | SYSTOLIC BLOOD PRESSURE: 111 MMHG | HEIGHT: 68 IN | RESPIRATION RATE: 18 BRPM | WEIGHT: 218.5 LBS | HEART RATE: 84 BPM | DIASTOLIC BLOOD PRESSURE: 72 MMHG | OXYGEN SATURATION: 95 %

## 2018-10-10 LAB
AMYLASE SERPL-CCNC: 53 U/L (ref 30–110)
DEPRECATED RDW RBC AUTO: 41.1 FL (ref 40–54)
ERYTHROCYTE [DISTWIDTH] IN BLOOD BY AUTOMATED COUNT: 12.9 % (ref 12–15)
GLUCOSE BLDC GLUCOMTR-MCNC: 186 MG/DL (ref 70–130)
HCT VFR BLD AUTO: 38.2 % (ref 37–47)
HGB BLD-MCNC: 13.1 G/DL (ref 12–16)
LIPASE SERPL-CCNC: 297 U/L (ref 23–203)
MCH RBC QN AUTO: 30 PG (ref 28–32)
MCHC RBC AUTO-ENTMCNC: 34.3 G/DL (ref 33–36)
MCV RBC AUTO: 87.4 FL (ref 82–98)
PLATELET # BLD AUTO: 346 10*3/MM3 (ref 130–400)
PMV BLD AUTO: 9.7 FL (ref 6–12)
RBC # BLD AUTO: 4.37 10*6/MM3 (ref 4.2–5.4)
WBC NRBC COR # BLD: 12.32 10*3/MM3 (ref 4.8–10.8)

## 2018-10-10 PROCEDURE — 25010000002 MORPHINE PER 10 MG: Performed by: SPECIALIST

## 2018-10-10 PROCEDURE — 82962 GLUCOSE BLOOD TEST: CPT

## 2018-10-10 PROCEDURE — 63710000001 INSULIN LISPRO (HUMAN) PER 5 UNITS: Performed by: SPECIALIST

## 2018-10-10 PROCEDURE — 82150 ASSAY OF AMYLASE: CPT | Performed by: SPECIALIST

## 2018-10-10 PROCEDURE — 25010000002 ENOXAPARIN PER 10 MG: Performed by: SPECIALIST

## 2018-10-10 PROCEDURE — 83690 ASSAY OF LIPASE: CPT | Performed by: SPECIALIST

## 2018-10-10 PROCEDURE — 25010000002 ONDANSETRON PER 1 MG: Performed by: SPECIALIST

## 2018-10-10 PROCEDURE — 85027 COMPLETE CBC AUTOMATED: CPT | Performed by: SPECIALIST

## 2018-10-10 RX ADMIN — MORPHINE SULFATE 4 MG: 4 INJECTION INTRAVENOUS at 08:31

## 2018-10-10 RX ADMIN — MILK OF MAGNESIA 10 ML: 2400 CONCENTRATE ORAL at 08:31

## 2018-10-10 RX ADMIN — SODIUM CHLORIDE, POTASSIUM CHLORIDE, SODIUM LACTATE AND CALCIUM CHLORIDE 75 ML/HR: 600; 310; 30; 20 INJECTION, SOLUTION INTRAVENOUS at 00:12

## 2018-10-10 RX ADMIN — INSULIN LISPRO 2 UNITS: 100 INJECTION, SOLUTION INTRAVENOUS; SUBCUTANEOUS at 00:12

## 2018-10-10 RX ADMIN — DOCUSATE SODIUM 100 MG: 100 CAPSULE ORAL at 08:31

## 2018-10-10 RX ADMIN — MORPHINE SULFATE 4 MG: 4 INJECTION INTRAVENOUS at 00:03

## 2018-10-10 RX ADMIN — FAMOTIDINE 20 MG: 20 TABLET, FILM COATED ORAL at 08:31

## 2018-10-10 RX ADMIN — ONDANSETRON 4 MG: 2 INJECTION INTRAMUSCULAR; INTRAVENOUS at 08:40

## 2018-10-10 RX ADMIN — ENOXAPARIN SODIUM 40 MG: 40 INJECTION SUBCUTANEOUS at 08:31

## 2018-10-10 NOTE — PROGRESS NOTES
Continued Stay Note   [facility]     Patient Name: Cherry Samuels  MRN: [id number]  Today's Date: 10/10/2018    Admit Date: 10/3/2018          Discharge Plan     Row Name 10/10/18 3410       Plan    Plan Home    Patient/Family in Agreement with Plan yes    Final Discharge Disposition Code 01 - home or self-care    Final Note Pt is being d/c'ed home today.               Discharge Codes    No documentation.       Expected Discharge Date and Time     Expected Discharge Date Expected Discharge Time    Oct 10, 2018             SHANNEN Sotelo

## 2018-10-10 NOTE — PROGRESS NOTES
Annabel eHctor MD FACS  Progress Note     LOS: 7 days   Patient Care Team:  Barrett Lerma MD as PCP - General  Barrett Lerma MD as PCP - Family Medicine      Subjective     Interval History:      pain improved.  donald po     Objective     Vital Signs  Temp:  [97.9 °F (36.6 °C)-98.7 °F (37.1 °C)] 98.1 °F (36.7 °C)  Heart Rate:  [79-93] 84  Resp:  [16-18] 18  BP: (111-137)/(63-99) 111/72    Physical Exam:  General appearance - alert, well appearing, and in no distress  Abdomen - soft, nontender, nondistended, no masses or organomegaly      Results Review:    Lab Results (last 24 hours)     Procedure Component Value Units Date/Time    Lipase [[id number]]  (Abnormal) Collected:  10/10/18 0344    Specimen:  Blood Updated:  10/10/18 0807     Lipase 297 (H) U/L     Amylase [[id number]]  (Normal) Collected:  10/10/18 0344    Specimen:  Blood Updated:  10/10/18 0807     Amylase 53 U/L     CBC (No Diff) [[id number]]  (Abnormal) Collected:  10/10/18 0344    Specimen:  Blood Updated:  10/10/18 0410     WBC 12.32 (H) 10*3/mm3      RBC 4.37 10*6/mm3      Hemoglobin 13.1 g/dL      Hematocrit 38.2 %      MCV 87.4 fL      MCH 30.0 pg      MCHC 34.3 g/dL      RDW 12.9 %      RDW-SD 41.1 fl      MPV 9.7 fL      Platelets 346 10*3/mm3     POC Glucose Once [[id number]]  (Abnormal) Collected:  10/10/18 0007    Specimen:  Blood Updated:  10/10/18 0018     Glucose 186 (H) mg/dL      Comment: : [id number] Yoshi DasilvaraMeter ID: [id number]       POC Glucose Once [[id number]]  (Abnormal) Collected:  10/09/18 1712    Specimen:  Blood Updated:  10/09/18 1726     Glucose 140 (H) mg/dL      Comment: : [id number] Krunal JohnsonaleaMeter ID: [id number]       POC Glucose Once [[id number]]  (Abnormal) Collected:  10/09/18 1122    Specimen:  Blood Updated:  10/09/18 1133     Glucose 135 (H) mg/dL      Comment: : [id number] Krunal Ayonter ID: [id number]           Imaging Results (last 24 hours)     ** No results found for  the last 24 hours. **            Assessment/Plan       Home with follow up with Dr. Shelbi Hector MD  10/10/18  9:34 AM
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
## 2018-10-10 NOTE — DISCHARGE SUMMARY
Consults     Date and Time Order Name Status Description    10/8/2018 0500 Inpatient Gastroenterology Consult Completed       Annabel Hector MD FACS Discharge Summary    Date of Discharge:  10/10/2018    Discharge Diagnosis: pancreatitis    Presenting Problem/History of Present Illness  Pancreatitis [K85.90]     Hospital Course  Patient is a 33 y.o. female presented with continued abdominal pain. Labs demonstrated persistently elevated lipase. CT demonstrated no abnormalities.  GI was consulted.  MRCP demonstrated possible narrowing of a pancreatic duct.  She is tolerating low fat diet.      Procedures Performed         Consults:   Consults     Date and Time Order Name Status Description    10/8/2018 0500 Inpatient Gastroenterology Consult Completed           Condition on Discharge:  good    Vital Signs  Temp:  [97.9 °F (36.6 °C)-98.7 °F (37.1 °C)] 98.1 °F (36.7 °C)  Heart Rate:  [79-93] 84  Resp:  [16-18] 18  BP: (111-137)/(63-99) 111/72    Physical Exam:   See History and Physical found in chart.    Discharge Disposition  Home or Self Care    Discharge Medications     Discharge Medications      Continue These Medications      Instructions Start Date   insulin detemir 100 UNIT/ML injection  Commonly known as:  LEVEMIR   70 Units, Subcutaneous, Nightly             Discharge Diet:     Activity at Discharge:     Follow-up Appointments  Future Appointments  Date Time Provider Department Center   10/24/2018 12:15 PM Portillo Alicea APRN [facility] None         Test Results Pending at Discharge       Annabel Hector MD  10/10/18  9:36 AM

## 2018-10-11 ENCOUNTER — READMISSION MANAGEMENT (OUTPATIENT)
Dept: CALL CENTER | Facility: HOSPITAL | Age: 33
End: 2018-10-11

## 2018-10-11 NOTE — OUTREACH NOTE
Prep Survey      Responses   Facility patient discharged from?  [facility]   Is patient eligible?  Yes   Discharge diagnosis  Pancreatitis,   Does the patient have one of the following disease processes/diagnoses(primary or secondary)?  Other   Does the patient have Home health ordered?  No   Is there a DME ordered?  No   Comments regarding appointments  See AVS   Prep survey completed?  Yes          Fatimah Brizuela RN

## 2018-10-12 ENCOUNTER — READMISSION MANAGEMENT (OUTPATIENT)
Dept: CALL CENTER | Facility: HOSPITAL | Age: 33
End: 2018-10-12

## 2018-10-12 NOTE — OUTREACH NOTE
Medical Week 1 Survey      Responses   Facility patient discharged from?  [facility]   Does the patient have one of the following disease processes/diagnoses(primary or secondary)?  Other   Is there a successful TCM telephone encounter documented?  No   Week 1 attempt successful?  No   Unsuccessful attempts  Attempt 1          Fatimah Alfaro RN

## 2018-10-16 ENCOUNTER — READMISSION MANAGEMENT (OUTPATIENT)
Dept: CALL CENTER | Facility: HOSPITAL | Age: 33
End: 2018-10-16

## 2018-10-16 NOTE — OUTREACH NOTE
Medical Week 1 Survey      Responses   Facility patient discharged from?  [facility]   Does the patient have one of the following disease processes/diagnoses(primary or secondary)?  Other   Is there a successful TCM telephone encounter documented?  No   Week 1 attempt successful?  No   Unsuccessful attempts  Attempt 2          Franny Kirby RN

## 2018-10-17 ENCOUNTER — READMISSION MANAGEMENT (OUTPATIENT)
Dept: CALL CENTER | Facility: HOSPITAL | Age: 33
End: 2018-10-17

## 2018-10-17 NOTE — OUTREACH NOTE
Medical Week 1 Survey      Responses   Facility patient discharged from?  [facility]   Does the patient have one of the following disease processes/diagnoses(primary or secondary)?  Other   Is there a successful TCM telephone encounter documented?  No   Week 1 attempt successful?  Yes   Call start time  1624   Call end time  1637   Discharge diagnosis  Pancreatitis   Is patient permission given to speak with other caregiver?  Yes   List who call center can speak with  kids or    Meds reviewed with patient/caregiver?  Yes   Is the patient having any side effects they believe may be caused by any medication additions or changes?  No   Does the patient have all medications ordered at discharge?  No   What is keeping the patient from filling the prescriptions?  Financial   Nursing Interventions  Nurse advised patient to call provider   Notified Case Management  Financial   Is the patient taking all medications as directed (includes completed medication regime)?  Yes   Medication comments  Patient does not have insulin, or meter strips cannot afford.  She has contacted Dr Lerma's APRN for assistance.   Does the patient have a primary care provider?   Yes   Does the patient have an appointment with their PCP within 7 days of discharge?  Greater than 7 days   Comments regarding PCP  [facility]   Nursing Interventions  Verified appointment date/time/provider   Comments  Oct 31 with PCP  Dr Hector 10/24  Neuro 10/23   Has home health visited the patient within 72 hours of discharge?  N/A   Notified Case Management  Psychosocial (Urgent)   Comments  Unable to afford insulin or strips has not checked BS or taken insulin since discharge. Email sent to case management   Did the patient receive a copy of their discharge instructions?  Yes   Nursing interventions  Reviewed instructions with patient, Educated on MyChart   What is the patient's perception of their health status since discharge?  Same   Is the patient/caregiver  able to teach back signs and symptoms related to disease process for when to call PCP?  Yes   Is the patient/caregiver able to teach back signs and symptoms related to disease process for when to call 911?  Yes   Is the patient/caregiver able to teach back the hierarchy of who to call/visit for symptoms/problems? PCP, Specialist, Home health nurse, Urgent Care, ED, 911  Yes   Week 1 call completed?  Yes          Guillermina Olivo RN

## 2018-10-24 ENCOUNTER — READMISSION MANAGEMENT (OUTPATIENT)
Dept: CALL CENTER | Facility: HOSPITAL | Age: 33
End: 2018-10-24

## 2018-10-24 NOTE — OUTREACH NOTE
Medical Week 2 Survey      Responses   Facility patient discharged from?  [facility]   Does the patient have one of the following disease processes/diagnoses(primary or secondary)?  Other   Week 2 attempt successful?  No   Unsuccessful attempts  Attempt 1          Tammy Roblero RN

## 2018-10-26 ENCOUNTER — READMISSION MANAGEMENT (OUTPATIENT)
Dept: CALL CENTER | Facility: HOSPITAL | Age: 33
End: 2018-10-26

## 2018-10-26 NOTE — OUTREACH NOTE
Medical Week 2 Survey      Responses   Facility patient discharged from?  [facility]   Does the patient have one of the following disease processes/diagnoses(primary or secondary)?  Other   Week 2 attempt successful?  Yes   Call start time  1006   Discharge diagnosis  Pancreatitis   Call end time  1011   Is patient permission given to speak with other caregiver?  Yes   List who call center can speak with  kids or    Meds reviewed with patient/caregiver?  Yes   Is the patient having any side effects they believe may be caused by any medication additions or changes?  No   Medication comments  Pt to have follow up on October 31.   What is the patient's perception of their health status since discharge?  Same   Is the patient/caregiver able to teach back signs and symptoms related to disease process for when to call PCP?  Yes   Is the patient/caregiver able to teach back signs and symptoms related to disease process for when to call 911?  Yes   Is the patient/caregiver able to teach back the hierarchy of who to call/visit for symptoms/problems? PCP, Specialist, Home health nurse, Urgent Care, ED, 911  Yes   Week 2 Call Completed?  Yes   Graduated  Yes   Did the patient feel the follow up calls were helpful during their recovery period?  Yes   Was the number of calls appropriate?  Yes   Does the patient have an Advance Directive or Living Will?  No   Is the patient/caregiver familiar with Advance Care Planning?  Yes   Would the patient like more information on Advance Care Planning?  No   Graduated/Revoked comments  Pt is stable. Pt states she has 3 children and her 2yr old broke his foot and she ahs a lot going on. Pt requested that we not call her and she is fine.          Melody Sanches RN

## 2019-02-27 ENCOUNTER — APPOINTMENT (OUTPATIENT)
Dept: CT IMAGING | Facility: HOSPITAL | Age: 34
End: 2019-02-27

## 2019-02-27 ENCOUNTER — HOSPITAL ENCOUNTER (EMERGENCY)
Facility: HOSPITAL | Age: 34
Discharge: HOME | End: 2019-02-27
Admitting: EMERGENCY MEDICINE

## 2019-02-27 VITALS
SYSTOLIC BLOOD PRESSURE: 115 MMHG | DIASTOLIC BLOOD PRESSURE: 85 MMHG | OXYGEN SATURATION: 96 % | HEIGHT: 66 IN | HEART RATE: 76 BPM | BODY MASS INDEX: 36.96 KG/M2 | TEMPERATURE: 98 F | WEIGHT: 230 LBS | RESPIRATION RATE: 18 BRPM

## 2019-02-27 DIAGNOSIS — G43.809 OTHER MIGRAINE WITHOUT STATUS MIGRAINOSUS, NOT INTRACTABLE: Primary | ICD-10-CM

## 2019-02-27 LAB
ALBUMIN SERPL-MCNC: 4.4 G/DL (ref 3.5–5)
ALBUMIN/GLOB SERPL: 1.6 G/DL (ref 1.1–2.5)
ALP SERPL-CCNC: 75 U/L (ref 24–120)
ALT SERPL W P-5'-P-CCNC: 31 U/L (ref 0–54)
ANION GAP SERPL CALCULATED.3IONS-SCNC: 11 MMOL/L (ref 4–13)
AST SERPL-CCNC: 22 U/L (ref 7–45)
BACTERIA UR QL AUTO: ABNORMAL /HPF
BASOPHILS # BLD AUTO: 0.08 10*3/MM3 (ref 0–0.2)
BASOPHILS NFR BLD AUTO: 0.6 % (ref 0–2)
BILIRUB SERPL-MCNC: 0.3 MG/DL (ref 0.1–1)
BILIRUB UR QL STRIP: NEGATIVE
BUN BLD-MCNC: 9 MG/DL (ref 5–21)
BUN/CREAT SERPL: 16.7 (ref 7–25)
CALCIUM SPEC-SCNC: 9 MG/DL (ref 8.4–10.4)
CHLORIDE SERPL-SCNC: 102 MMOL/L (ref 98–110)
CLARITY UR: CLEAR
CO2 SERPL-SCNC: 23 MMOL/L (ref 24–31)
COLOR UR: YELLOW
CREAT BLD-MCNC: 0.54 MG/DL (ref 0.5–1.4)
DEPRECATED RDW RBC AUTO: 41.8 FL (ref 40–54)
EOSINOPHIL # BLD AUTO: 0.11 10*3/MM3 (ref 0–0.7)
EOSINOPHIL NFR BLD AUTO: 0.8 % (ref 0–4)
ERYTHROCYTE [DISTWIDTH] IN BLOOD BY AUTOMATED COUNT: 12.7 % (ref 12–15)
GFR SERPL CREATININE-BSD FRML MDRD: 131 ML/MIN/1.73
GLOBULIN UR ELPH-MCNC: 2.7 GM/DL
GLUCOSE BLD-MCNC: 274 MG/DL (ref 70–100)
GLUCOSE UR STRIP-MCNC: ABNORMAL MG/DL
HCT VFR BLD AUTO: 38.5 % (ref 37–47)
HGB BLD-MCNC: 13.4 G/DL (ref 12–16)
HGB UR QL STRIP.AUTO: ABNORMAL
HYALINE CASTS UR QL AUTO: ABNORMAL /LPF
IMM GRANULOCYTES # BLD AUTO: 0.04 10*3/MM3 (ref 0–0.05)
IMM GRANULOCYTES NFR BLD AUTO: 0.3 % (ref 0–5)
KETONES UR QL STRIP: NEGATIVE
LEUKOCYTE ESTERASE UR QL STRIP.AUTO: NEGATIVE
LYMPHOCYTES # BLD AUTO: 2.9 10*3/MM3 (ref 0.72–4.86)
LYMPHOCYTES NFR BLD AUTO: 22.1 % (ref 15–45)
MAGNESIUM SERPL-MCNC: 1.7 MG/DL (ref 1.4–2.2)
MCH RBC QN AUTO: 31 PG (ref 28–32)
MCHC RBC AUTO-ENTMCNC: 34.8 G/DL (ref 33–36)
MCV RBC AUTO: 89.1 FL (ref 82–98)
MONOCYTES # BLD AUTO: 0.83 10*3/MM3 (ref 0.19–1.3)
MONOCYTES NFR BLD AUTO: 6.3 % (ref 4–12)
NEUTROPHILS # BLD AUTO: 9.14 10*3/MM3 (ref 1.87–8.4)
NEUTROPHILS NFR BLD AUTO: 69.9 % (ref 39–78)
NITRITE UR QL STRIP: NEGATIVE
NRBC BLD AUTO-RTO: 0 /100 WBC (ref 0–0)
PH UR STRIP.AUTO: 6.5 [PH] (ref 5–8)
PLATELET # BLD AUTO: 314 10*3/MM3 (ref 130–400)
PMV BLD AUTO: 9.4 FL (ref 6–12)
POTASSIUM BLD-SCNC: 4 MMOL/L (ref 3.5–5.3)
PROT SERPL-MCNC: 7.1 G/DL (ref 6.3–8.7)
PROT UR QL STRIP: NEGATIVE
RBC # BLD AUTO: 4.32 10*6/MM3 (ref 4.2–5.4)
RBC # UR: ABNORMAL /HPF
REF LAB TEST METHOD: ABNORMAL
SODIUM BLD-SCNC: 136 MMOL/L (ref 135–145)
SP GR UR STRIP: >1.03 (ref 1–1.03)
SQUAMOUS #/AREA URNS HPF: ABNORMAL /HPF
UROBILINOGEN UR QL STRIP: ABNORMAL
WBC NRBC COR # BLD: 13.1 10*3/MM3 (ref 4.8–10.8)
WBC UR QL AUTO: ABNORMAL /HPF
YEAST URNS QL MICRO: ABNORMAL /HPF

## 2019-02-27 PROCEDURE — 70450 CT HEAD/BRAIN W/O DYE: CPT

## 2019-02-27 PROCEDURE — 25010000002 DIPHENHYDRAMINE PER 50 MG: Performed by: PHYSICIAN ASSISTANT

## 2019-02-27 PROCEDURE — 96374 THER/PROPH/DIAG INJ IV PUSH: CPT

## 2019-02-27 PROCEDURE — 83735 ASSAY OF MAGNESIUM: CPT | Performed by: PHYSICIAN ASSISTANT

## 2019-02-27 PROCEDURE — 99283 EMERGENCY DEPT VISIT LOW MDM: CPT

## 2019-02-27 PROCEDURE — 81001 URINALYSIS AUTO W/SCOPE: CPT | Performed by: PHYSICIAN ASSISTANT

## 2019-02-27 PROCEDURE — 96375 TX/PRO/DX INJ NEW DRUG ADDON: CPT

## 2019-02-27 PROCEDURE — 85025 COMPLETE CBC W/AUTO DIFF WBC: CPT | Performed by: PHYSICIAN ASSISTANT

## 2019-02-27 PROCEDURE — 25010000002 PROCHLORPERAZINE EDISYLATE PER 10 MG: Performed by: PHYSICIAN ASSISTANT

## 2019-02-27 PROCEDURE — 80053 COMPREHEN METABOLIC PANEL: CPT | Performed by: PHYSICIAN ASSISTANT

## 2019-02-27 RX ORDER — ACETAMINOPHEN 500 MG
1000 TABLET ORAL ONCE
Status: COMPLETED | OUTPATIENT
Start: 2019-02-27 | End: 2019-02-27

## 2019-02-27 RX ORDER — DIPHENHYDRAMINE HYDROCHLORIDE 50 MG/ML
25 INJECTION INTRAMUSCULAR; INTRAVENOUS ONCE
Status: COMPLETED | OUTPATIENT
Start: 2019-02-27 | End: 2019-02-27

## 2019-02-27 RX ADMIN — ACETAMINOPHEN 1000 MG: 500 TABLET, FILM COATED ORAL at 20:11

## 2019-02-27 RX ADMIN — PROCHLORPERAZINE EDISYLATE 10 MG: 5 INJECTION INTRAMUSCULAR; INTRAVENOUS at 20:10

## 2019-02-27 RX ADMIN — DIPHENHYDRAMINE HYDROCHLORIDE 25 MG: 50 INJECTION, SOLUTION INTRAMUSCULAR; INTRAVENOUS at 20:10

## 2019-10-11 ENCOUNTER — NURSE TRIAGE (OUTPATIENT)
Dept: CALL CENTER | Facility: HOSPITAL | Age: 34
End: 2019-10-11

## 2019-10-12 NOTE — TELEPHONE ENCOUNTER
"Tamra states rectal bleeding bright red blood and clot's. She states she is having lower abdominal and back pain. She denies fever but states she is lightheaded and having dizziness. Advised per guideline.     Reason for Disposition  • SEVERE dizziness (e.g., unable to stand, requires support to walk, feels like passing out now)    Additional Information  • Negative: Shock suspected (e.g., cold/pale/clammy skin, too weak to stand, low BP, rapid pulse)  • Negative: Difficult to awaken or acting confused (e.g., disoriented, slurred speech)  • Negative: Passed out (i.e., lost consciousness, collapsed and was not responding)  • Negative: [1] Vomiting AND [2] contains red blood or black (\"coffee ground\") material  (Exception: few red streaks in vomit that only happened once)  • Negative: Sounds like a life-threatening emergency to the triager  • Negative: Diarrhea is main symptom  • Negative: Stool color other than brown or tan is main concern  (no bleeding and no melena)  • Negative: SEVERE rectal bleeding (large blood clots; on and off, or constant bleeding)    Answer Assessment - Initial Assessment Questions  1. APPEARANCE of BLOOD: \"What color is it?\" \"Is it passed separately, on the surface of the stool, or mixed in with the stool?\"       Bright red bleeding and noticed clots  2. AMOUNT: \"How much blood was passed?\"       Cup full   3. FREQUENCY: \"How many times has blood been passed with the stools?\"       Once so far   4. ONSET: \"When was the blood first seen in the stools?\" (Days or weeks)        Tonight and abdominal pain   5. DIARRHEA: \"Is there also some diarrhea?\" If so, ask: \"How many diarrhea stools were passed in past 24 hours?\"       Denies   6. CONSTIPATION: \"Do you have constipation?\" If so, \"How bad is it?\"      Denies   7. RECURRENT SYMPTOMS: \"Have you had blood in your stools before?\" If so, ask: \"When was the last time?\" and \"What happened that time?\"      Not this bad   8. BLOOD THINNERS: \"Do you " "take any blood thinners?\" (e.g., Coumadin/warfarin, Pradaxa/dabigatran, aspirin)      Denies states taking aleve   9. OTHER SYMPTOMS: \"Do you have any other symptoms?\"  (e.g., abdominal pain, vomiting, dizziness, fever)     Abdominal pain is lower stomach and back   10. PREGNANCY: \"Is there any chance you are pregnant?\" \"When was your last menstrual period?\"       Denies    Protocols used: RECTAL BLEEDING-ADULT-AH      "

## 2020-07-18 ENCOUNTER — NURSE TRIAGE (OUTPATIENT)
Dept: CALL CENTER | Facility: HOSPITAL | Age: 35
End: 2020-07-18

## 2020-07-19 NOTE — TELEPHONE ENCOUNTER
"    Reason for Disposition  • Blood glucose > 500 mg/dL (27.8 mmol/L)    Additional Information  • Negative: Unconscious or difficult to awaken  • Negative: Acting confused (e.g., disoriented, slurred speech)  • Negative: Very weak (e.g., can't stand)  • Negative: Sounds like a life-threatening emergency to the triager  • Negative: [1] Vomiting AND [2] signs of dehydration (e.g., very dry mouth, lightheaded, dark urine)  • Negative: [1] Blood glucose > 240 mg/dL (13.3 mmol/L) AND [2] rapid breathing    Answer Assessment - Initial Assessment Questions  1. BLOOD GLUCOSE: \"What is your blood glucose level?\"       900 x 2  2. ONSET: \"When did you check the blood glucose?\"      Just now  3. USUAL RANGE: \"What is your glucose level usually?\" (e.g., usual fasting morning value, usual evening value)      n/a  4. KETONES: \"Do you check for ketones (urine or blood test strips)?\" If yes, ask: \"What does the test show now?\"       no  5. TYPE 1 or 2:  \"Do you know what type of diabetes you have?\"  (e.g., Type 1, Type 2, Gestational; doesn't know)         6. INSULIN: \"Do you take insulin?\" \"What type of insulin(s) do you use? What is the mode of delivery? (syringe, pen; injection or pump)?\"       Yes took 45units bedtime and 65 sliding scale per pt  7. DIABETES PILLS: \"Do you take any pills for your diabetes?\" If yes, ask: \"Have you missed taking any pills recently?\"      n/a  8. OTHER SYMPTOMS: \"Do you have any symptoms?\" (e.g., fever, frequent urination, difficulty breathing, dizziness, weakness, vomiting)      Very stresssed out per pt  9. PREGNANCY: \"Is there any chance you are pregnant?\" \"When was your last menstrual period?\"      no    Protocols used: DIABETES - HIGH BLOOD SUGAR-ADULT-AH      "

## 2022-05-09 ENCOUNTER — OFFICE VISIT (OUTPATIENT)
Dept: BARIATRICS/WEIGHT MGMT | Facility: CLINIC | Age: 37
End: 2022-05-09

## 2022-05-09 VITALS
SYSTOLIC BLOOD PRESSURE: 114 MMHG | BODY MASS INDEX: 41.81 KG/M2 | HEART RATE: 106 BPM | TEMPERATURE: 97.7 F | HEIGHT: 67 IN | DIASTOLIC BLOOD PRESSURE: 64 MMHG | OXYGEN SATURATION: 97 % | WEIGHT: 266.4 LBS

## 2022-05-09 DIAGNOSIS — E66.01 CLASS 3 SEVERE OBESITY DUE TO EXCESS CALORIES WITH SERIOUS COMORBIDITY AND BODY MASS INDEX (BMI) OF 40.0 TO 44.9 IN ADULT: Primary | ICD-10-CM

## 2022-05-09 DIAGNOSIS — Z79.4 DIABETES MELLITUS DUE TO UNDERLYING CONDITION WITH OTHER SPECIFIED COMPLICATION, WITH LONG-TERM CURRENT USE OF INSULIN: ICD-10-CM

## 2022-05-09 DIAGNOSIS — E08.69 DIABETES MELLITUS DUE TO UNDERLYING CONDITION WITH OTHER SPECIFIED COMPLICATION, WITH LONG-TERM CURRENT USE OF INSULIN: ICD-10-CM

## 2022-05-09 DIAGNOSIS — G36.0 DEVIC'S DISEASE: ICD-10-CM

## 2022-05-09 PROCEDURE — 99204 OFFICE O/P NEW MOD 45 MIN: CPT | Performed by: NURSE PRACTITIONER

## 2022-05-09 NOTE — ASSESSMENT & PLAN NOTE
Patient's (Body mass index is 42.35 kg/m².) indicates that they are morbidly obese (BMI > 40 or > 35 with obesity - related health condition) with health conditions that include diabetes mellitus . Weight is worsening. BMI is is above average; BMI management plan is completed. We discussed portion control and increasing exercise.

## 2022-05-09 NOTE — PROGRESS NOTES
"Metabolic and Bariatric Surgery Adult Nutrition Assessment    Patient Name: Cherry Samuels   YOB: 1985   MRN: [id number]     Assessment Date:  2022     Reason for Visit: Initial Nutrition Assessment     Treatment Pathway: Preoperative Bariatric Surgery, Visit 1    Assessment    Anthropometrics   Wt Readings from Last 1 Encounters:   22 121 kg (266 lb 6.4 oz)     Ht Readings from Last 1 Encounters:   22 168.9 cm (66.5\")     BMI Readings from Last 1 Encounters:   22 42.35 kg/m²        Initial Weight/Date: 266.4 lns  Weight Changes this month: n/a  Net Weight Change: n/a    Past Medical History:   Diagnosis Date   • Devic's disease (HCC)    • Diabetes mellitus (HCC)       Past Surgical History:   Procedure Laterality Date   •  SECTION      x 2   • TONSILLECTOMY     • TUBAL ABDOMINAL LIGATION        Current Outpatient Medications   Medication Sig Dispense Refill   • insulin detemir (LEVEMIR) 100 UNIT/ML injection Inject 70 Units under the skin into the appropriate area as directed Every Night.       No current facility-administered medications for this visit.      Allergies   Allergen Reactions   • Bactrim [Sulfamethoxazole-Trimethoprim] Hives   • Iodine Swelling     Blisters     • Latex Itching and Swelling   • Dexamethasone Unknown (See Comments)     No steroids, pt becomes hypotensive        Motivation for weight loss includes: wants to be healthier for self and children (raised 12)    Pertinent Social/Behavior/Environmental History: living in a camper.     Nutrition Recall  Eating 2 meals daily. Loves rice. Doesn't really crave sweets  (M1) usually skips brkfst. Yesterday 2 pancakes and 2 sausages  (M2) snack on sunflower seeds/pork rinds/beef jerky  (M3) pork chop and yams.   (M4) n/a  Snacking - snacks on sunflower seeds  Monitoring portions- eyeball/estimate  Calculating Protein- no  Drinking carbonated beverages- loves coffee ([employer], white chocolate bottled " can), MILO sweet tea (~32 ounces/d)  Fluid Intake- bottled water (~4 bottles/day)    Exercise: Not currently, spurs in feet looking forward to getting shots soon.       Nutrition Intervention  Nutrition education and nutrition coaching for behavior change provided.  Strategies used included Comprehensive education, Motivational Interviewing , Problem Solving, Skill Development for meal planning, Ongoing reinforcement, Provided sample menus and Provided samples of protein supplement   Review of medical weight loss prescription 4 meal/day plan and reviewed nutritional needs for Preoperative Bariatric Surgery, Visit 1.    Diet Changes  Eat 4 meals per day with protein and vegetables at each meal, no carbs after meal 2., Protein goal: 65 gms., Eat vegetables first at each meal., Discussed protein guidelines for shakes and bars., Reduce snacking -use foods from free foods list only., Eliminate snacks., Reduce fat, sugar, and/or salt in food choices., Choose more nutrient dense foods., Choose foods with increased fiber., Monitor portion sizes using a food scale and/or measuring cup., Eliminate soda and sugar-sweetened beverages and Increase fluid intake to 64 ounces per day      Goals  1. 4 specific meals times each day.  2. Wean off sweet tea and coffee.   3. Quit smoking.     Self-monitoring strategies such as keeping a food journal (on paper or electronically) and calculating fluid/protein intake were discussed.    Monitoring/Evaluation Plan  Anticipate follow up per program protocol. Continue collaboration of care with physician and treatment team.     Electronically signed by  Bety Pate RDN, LD  05/09/2022 10:07 CDT.

## 2022-05-09 NOTE — PROGRESS NOTES
No care team member to display      Subjective     Patient is a 36 y.o. female presents with morbid obesity and her Body mass index is 42.35 kg/m².     She is here for discussion of surgical weight loss options.  She stated she has been with the disease of obesity for year(s).  She stated she suffers from diabetes and morbid obesity due to her weight gain.  She stated that weight loss helps alleviate these symptoms.   She stated that she has tried other diet regimens such as high protein to help with weight loss.  She stated that she has attempted these conservative methods for weight loss without maintaining long term success.  Today she would like to discuss surgical weight loss options such as the Laparoscopic Sleeve Gastrectomy or the Laparoscopic R - Y Gastric Bypass.     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     Review of Systems   Respiratory: Negative.    Cardiovascular: Negative.    Gastrointestinal: Negative.    Endocrine: Negative.    Musculoskeletal: Positive for arthralgias, back pain and joint swelling.   Neurological: Positive for weakness and numbness.   Psychiatric/Behavioral: The patient is nervous/anxious.         History  Past Medical History:   Diagnosis Date   • Devic's disease (HCC)    • Diabetes mellitus (HCC)       Past Surgical History:   Procedure Laterality Date   •  SECTION      x 2   • TONSILLECTOMY     • TUBAL ABDOMINAL LIGATION        Social History     Socioeconomic History   • Marital status: Single   Tobacco Use   • Smoking status: Current Every Day Smoker     Packs/day: 1.00     Years: 15.00     Pack years: 15.00     Types: Cigarettes   • Smokeless tobacco: Never Used   Substance and Sexual Activity   • Alcohol use: No   • Drug use: No   • Sexual activity: Defer      Family History   Problem Relation Age of Onset   • Diabetes Mother    • Diabetes Father    • Diabetes Sister    • No Known Problems Maternal Grandmother    • No Known Problems Maternal  Grandfather    • No Known Problems Paternal Grandmother    • No Known Problems Paternal Grandfather       Allergies   Allergen Reactions   • Bactrim [Sulfamethoxazole-Trimethoprim] Hives   • Iodine Swelling     Blisters     • Latex Itching and Swelling   • Dexamethasone Unknown (See Comments)     No steroids, pt becomes hypotensive          Current Outpatient Medications:   •  insulin detemir (LEVEMIR) 100 UNIT/ML injection, Inject 70 Units under the skin into the appropriate area as directed Every Night., Disp: , Rfl:     Objective     Vital Signs  Temp:  [97.7 °F (36.5 °C)] 97.7 °F (36.5 °C)  Heart Rate:  [106] 106  BP: (114)/(64) 114/64  Body mass index is 42.35 kg/m².      05/09/22 0919   Weight: 121 kg (266 lb 6.4 oz)       Physical Exam  Vitals reviewed.   Constitutional:       Appearance: She is obese.   Cardiovascular:      Rate and Rhythm: Normal rate and regular rhythm.   Pulmonary:      Effort: Pulmonary effort is normal.   Abdominal:      General: Bowel sounds are normal.      Palpations: Abdomen is soft.   Musculoskeletal:      Comments: Abnormal gait   Skin:     General: Skin is warm and dry.   Neurological:      Mental Status: She is alert and oriented to person, place, and time.   Psychiatric:         Mood and Affect: Mood normal.         Behavior: Behavior normal.            Results Review:   I reviewed the patient's new clinical results.      Class 3 Severe Obesity (BMI >=40). Obesity-related health conditions include the following: diabetes mellitus. Obesity is worsening. BMI is is above average; BMI management plan is completed. We discussed portion control and increasing exercise.      Assessment/Plan   Diagnoses and all orders for this visit:    1. Class 3 severe obesity due to excess calories with serious comorbidity and body mass index (BMI) of 40.0 to 44.9 in adult (HCC) (Primary)  Assessment & Plan:  Patient's (Body mass index is 42.35 kg/m².) indicates that they are morbidly obese (BMI >  40 or > 35 with obesity - related health condition) with health conditions that include diabetes mellitus . Weight is worsening. BMI is is above average; BMI management plan is completed. We discussed portion control and increasing exercise.       2. Diabetes mellitus due to underlying condition with other specified complication, with long-term current use of insulin (HCC)  Comments:  Advised to monitor glucose levels well begetting prescription meal plan.  Follow-up with PCP for insulin adjustments as needed.  I have requested recent A1c    3. Devic's disease (HCC)  Comments:  Will further evaluate to determine if patient is surgical candidate.  Patient will call back with medication list.    She has been provided a structured dietary regimen based off of her behavior.  I discussed with the patient the etiology of the disease of obesity and the potential comorbid conditions associated with this disease.  She was instructed to follow the dietary regimen and follow-up with our program in 1 month's time with any additional questions as they may arise during this time.  We emphasized on focusing on proteins and meals high in fiber as well as adequate hydration that exceed 64 ounces of water daily.    I explained that I anticipate the patient to lose weight prior to her next monthly visit.  I encouraged patient to have a reward day once a month.      I discussed the patient's findings and my recommendations with patient.     I have also recommended that she obtain a cardiac risk assessment and psychiatric evaluation prior to surgery consideration.    1. Today the patient  received the 4 meals/day diet prescription, which was explained to patient.  Patient will see the dietitian today to further discuss goals for diet, exercise, and lifestyle. Patient has received intensive behavioral therapy for obesity today. I explained the pathophysiology of the disease and its storage component. We also discussed Dr. Rosario' edgar  of the program. Nutrition counseling ordered today.     2. Current comorbid condition of  diabetes and joint pain associated with her morbid obesity is reported to be stable on her current treatment regimen and medications. We anticipate the comorbid condition to improve as we address her morbid obesity.     Patient will return in 1 month for nutritional counseling follow-up with myself and dietitian.  Patient did not know her medications upon arrival today.  She is going to contact her office with her medication list and I will then further evaluate to determine if patient is a surgical candidate.  She verbalizes understanding and is agreeable with this plan.  Patient is also having her A1c checked this month and will bring her most recent A1c to her next appointment.    A total of 45 minutes was spent face to face with this patient and over half of the time was spent on counseling and coordination of care for the disease of obesity. We specifically reviewed the dietary prescription and I made recommendations toward increasing exercise as tolerated as well as focusing on training their behavior toward storing less.    Abeba Alas, JOSSY     05/09/22  11:27 CDT

## 2022-05-13 ENCOUNTER — PATIENT ROUNDING (BHMG ONLY) (OUTPATIENT)
Dept: BARIATRICS/WEIGHT MGMT | Facility: CLINIC | Age: 37
End: 2022-05-13

## 2022-05-13 NOTE — PROGRESS NOTES
May 13, 2022    Hello, may I speak with Cherry Samuels?    My name is Sissy       I am  with [facility]  [address], [address]  [place] [address] [phone].    Before we get started may I verify your date of birth? 1985    I am calling to officially welcome you to our practice and ask about your recent visit. Is this a good time to talk? Yes ma'am it is .    Tell me about your visit with us. What things went well?  The whole office was great.        We're always looking for ways to make our patients' experiences even better. Do you have recommendations on ways we may improve?  No ma'am, it was a wonderful visit .    Overall were you satisfied with your first visit to our practice? Yes ma'am I was. Let me tell you this I have never been treated with more respect than I was with your office.       I appreciate you taking the time to speak with me today. Is there anything else I can do for you? No there isn't , thank you for calling me .      Thank you, and have a great day.

## 2022-06-14 ENCOUNTER — OFFICE VISIT (OUTPATIENT)
Dept: BARIATRICS/WEIGHT MGMT | Facility: CLINIC | Age: 37
End: 2022-06-14

## 2022-06-14 VITALS
TEMPERATURE: 98.9 F | HEIGHT: 67 IN | HEART RATE: 108 BPM | SYSTOLIC BLOOD PRESSURE: 124 MMHG | DIASTOLIC BLOOD PRESSURE: 85 MMHG | OXYGEN SATURATION: 97 % | WEIGHT: 267.8 LBS | BODY MASS INDEX: 42.03 KG/M2

## 2022-06-14 DIAGNOSIS — F17.209 NICOTINE DEPENDENCE WITH NICOTINE-INDUCED DISORDER, UNSPECIFIED NICOTINE PRODUCT TYPE: ICD-10-CM

## 2022-06-14 DIAGNOSIS — E66.01 CLASS 3 SEVERE OBESITY DUE TO EXCESS CALORIES WITH SERIOUS COMORBIDITY AND BODY MASS INDEX (BMI) OF 40.0 TO 44.9 IN ADULT: Primary | ICD-10-CM

## 2022-06-14 DIAGNOSIS — E08.69 DIABETES MELLITUS DUE TO UNDERLYING CONDITION WITH OTHER SPECIFIED COMPLICATION, WITH LONG-TERM CURRENT USE OF INSULIN: ICD-10-CM

## 2022-06-14 DIAGNOSIS — Z79.4 DIABETES MELLITUS DUE TO UNDERLYING CONDITION WITH OTHER SPECIFIED COMPLICATION, WITH LONG-TERM CURRENT USE OF INSULIN: ICD-10-CM

## 2022-06-14 PROCEDURE — 99213 OFFICE O/P EST LOW 20 MIN: CPT | Performed by: NURSE PRACTITIONER

## 2022-06-14 RX ORDER — TIZANIDINE 4 MG/1
4 TABLET ORAL NIGHTLY PRN
COMMUNITY

## 2022-06-14 NOTE — PROGRESS NOTES
"Patient Care Team:  Evie Shaw APRN as PCP - General (Nurse Practitioner)    Reason for Visit:  Surgical Weight loss, V2     Subjective   Cherry Samuels is a 36 y.o. female.     Cherry is here for follow-up and continued medical management of her morbid obesity.  She is currently on a prescription diet.  Cherry previously was to apply dietary changes such as following the meal plan as directed.  She admits to 2-3 meals per day.  As a result she gained weight since her last visit.    Review Of Systems:  Review of Systems   Constitutional: Negative.    Respiratory: Negative.    Cardiovascular: Negative.    Gastrointestinal: Negative.    Endocrine: Negative.    Musculoskeletal: Negative.    Psychiatric/Behavioral: Negative.        The following portions of the patient's history were reviewed and updated as appropriate: allergies, current medications, past family history, past medical history, past social history, past surgical history, and problem list.    Objective   /85 (BP Location: Right arm, Patient Position: Sitting, Cuff Size: Adult)   Pulse 108   Temp 98.9 °F (37.2 °C)   Ht 168.9 cm (66.5\")   Wt 121 kg (267 lb 12.8 oz)   SpO2 97%   BMI 42.58 kg/m²       06/14/22  1131   Weight: 121 kg (267 lb 12.8 oz)       Physical Exam  Vitals reviewed.   Constitutional:       Appearance: She is obese.   Cardiovascular:      Rate and Rhythm: Normal rate and regular rhythm.   Pulmonary:      Effort: Pulmonary effort is normal.   Abdominal:      General: Bowel sounds are normal.      Palpations: Abdomen is soft.   Skin:     General: Skin is warm and dry.   Neurological:      Mental Status: She is alert and oriented to person, place, and time.   Psychiatric:         Mood and Affect: Mood normal.         Behavior: Behavior normal.         Class 3 Severe Obesity (BMI >=40). Obesity-related health conditions include the following: diabetes mellitus and dyslipidemias. Obesity is worsening. BMI is is above " average; BMI management plan is completed. We discussed portion control and increasing exercise.     Assessment & Plan   Diagnoses and all orders for this visit:    1. Class 3 severe obesity due to excess calories with serious comorbidity and body mass index (BMI) of 40.0 to 44.9 in adult ([facility]) (Primary)  Assessment & Plan:  Patient's (Body mass index is 42.58 kg/m².) indicates that they are morbidly obese (BMI > 40 or > 35 with obesity - related health condition) with health conditions that include diabetes mellitus . Weight is improving with treatment. BMI is is above average; BMI management plan is completed. We discussed portion control and increasing exercise.     Orders:  -     Ambulatory Referral to Psychiatry  -     ECG 12 Lead; Future    2. Diabetes mellitus due to underlying condition with other specified complication, with long-term current use of insulin ([facility])  Comments:  To need to monitor glucose levels.  Nutritional counseling provided.    3. Nicotine dependence with nicotine-induced disorder, unspecified nicotine product type  Comments:  Smoking cessation counseling provided.       Cherry Samuels was seen today for follow-up, obesity, nutrition counseling and weight loss.  She has lost weight since her last visit.  Today we discussed healthy changes in lifestyle, diet, and exercise. Dietician consultation obtained.  Cherry Samuels had received handouts to her explaining the recommendation on portion sizes/appetite control/reading nutrition labels.   Intensive behavioral therapy for obesity was done today as well.     Goals for this month are:    1.  States that she has had some personal stressors that has made it difficult for her to follow the prescription meal plan.  Patient is going to log meals and bring her food journal into next appointment.  I have reviewed the prescription meal plan with patient and have encouraged her to begin following get into contact her office if she struggles prior to  her next appointment.  2.  Patient has been encouraged to eliminate nicotine use.  I encouraged her to contact 1 [phone] quit now or follow-up with PCP for assistance.      Follow up in one month for a weight recheck.

## 2022-06-14 NOTE — ASSESSMENT & PLAN NOTE
Patient's (Body mass index is 42.58 kg/m².) indicates that they are morbidly obese (BMI > 40 or > 35 with obesity - related health condition) with health conditions that include diabetes mellitus . Weight is improving with treatment. BMI is is above average; BMI management plan is completed. We discussed portion control and increasing exercise.

## 2022-07-12 ENCOUNTER — OFFICE VISIT (OUTPATIENT)
Dept: BARIATRICS/WEIGHT MGMT | Facility: CLINIC | Age: 37
End: 2022-07-12

## 2022-07-12 VITALS
HEART RATE: 102 BPM | HEIGHT: 67 IN | SYSTOLIC BLOOD PRESSURE: 118 MMHG | WEIGHT: 264.2 LBS | OXYGEN SATURATION: 96 % | TEMPERATURE: 98.9 F | BODY MASS INDEX: 41.47 KG/M2 | DIASTOLIC BLOOD PRESSURE: 84 MMHG

## 2022-07-12 DIAGNOSIS — E66.01 CLASS 3 SEVERE OBESITY DUE TO EXCESS CALORIES WITH SERIOUS COMORBIDITY AND BODY MASS INDEX (BMI) OF 40.0 TO 44.9 IN ADULT: Primary | ICD-10-CM

## 2022-07-12 DIAGNOSIS — E08.69 DIABETES MELLITUS DUE TO UNDERLYING CONDITION WITH OTHER SPECIFIED COMPLICATION, WITH LONG-TERM CURRENT USE OF INSULIN: ICD-10-CM

## 2022-07-12 DIAGNOSIS — Z79.4 DIABETES MELLITUS DUE TO UNDERLYING CONDITION WITH OTHER SPECIFIED COMPLICATION, WITH LONG-TERM CURRENT USE OF INSULIN: ICD-10-CM

## 2022-07-12 PROCEDURE — 99213 OFFICE O/P EST LOW 20 MIN: CPT | Performed by: SURGERY

## 2022-07-12 NOTE — PROGRESS NOTES
"Patient Care Team:  Evie Shaw APRN as PCP - General (Nurse Practitioner)    Reason for Visit:  Surgical Weight loss      Subjective   Cherry Samuels is a 37 y.o. female.     Cherry is here for follow-up and continued medical management of her morbid obesity.  She is currently on a prescription diet.  Cherry previously was to apply dietary changes such as following the meal plan as directed.  She admits to following a dietary prescription however does struggle due to living in a hotel at this time.  As a result she had no significant change in weight since her last visit.    Review Of Systems:  General ROS: positive for  - night sweats and sleep disturbance  Respiratory ROS: no cough, shortness of breath, or wheezing  Cardiovascular ROS: no chest pain or dyspnea on exertion    The following portions of the patient's history were reviewed and updated as appropriate: allergies, current medications, past family history, past medical history, past social history, past surgical history and problem list.    Objective   /84 (BP Location: Right arm, Patient Position: Sitting, Cuff Size: Adult)   Pulse 102   Temp 98.9 °F (37.2 °C)   Ht 168.9 cm (66.5\")   Wt 120 kg (264 lb 3.2 oz)   SpO2 96%   BMI 42.00 kg/m²       07/12/22  1327   Weight: 120 kg (264 lb 3.2 oz)       General Appearance:  awake, alert, oriented, in no acute distress    Assessment & Plan     Encounter Diagnoses   Name Primary?   • Class 3 severe obesity due to excess calories with serious comorbidity and body mass index (BMI) of 40.0 to 44.9 in adult (HCC) Yes   • Diabetes mellitus due to underlying condition with other specified complication, with long-term current use of insulin (HCC)        Cherry Samuels was seen today for follow-up, obesity, nutrition counseling and weight loss.  She has no significant change in her weight since her last visit.  Today we discussed healthy changes in lifestyle, diet, and exercise. Dietician " consultation obtained.  Cherry DONOHUE Abril had received handouts to her explaining the recommendation on portion sizes/appetite control/reading nutrition labels.   Intensive behavioral therapy for obesity was done today as well.   Goals for this month are: Due to the patient still smoking and having a 14-year history of smoking I recommended that she work alongside with her primary care provider to help facilitate nicotine cessation and a more structured fashion.  She is comfortable and has a great relationship with her primary care provider and will do so.  I expect the patient to have this accomplished and have a nicotine cessation regimen under the guidance of her primary care provider prior to her next visit.  She will visit with our nurse practitioner at this time with those plans.    Follow up in one month for a weight recheck.

## 2023-11-06 ENCOUNTER — NURSE TRIAGE (OUTPATIENT)
Dept: CALL CENTER | Facility: HOSPITAL | Age: 38
End: 2023-11-06
Payer: MEDICARE

## 2023-11-07 NOTE — TELEPHONE ENCOUNTER
Reason for Disposition  • [1] Caller has NON-URGENT question AND [2] triager unable to answer question    Additional Information  • Negative: Sounds like a life-threatening emergency to the triager  • Negative: Chest pain  • Negative: Difficulty breathing  • Negative: Acting confused (e.g., disoriented, slurred speech) or excessively sleepy  • Negative: Post-Op tonsil and adenoid surgery, symptoms or questions about  • Negative: Surgical incision symptoms and questions  • Negative: [1] Pain or burning with passing urine (urination) AND [2] male  • Negative: [1] Pain or burning with passing urine (urination) AND [2] female  • Negative: Constipation  • Negative: New or worsening leg (calf, thigh) pain  • Negative: New or worsening leg swelling  • Negative: Dizziness is severe, or persists > 24 hours after surgery  • Negative: Pain, redness, swelling, or pus at IV Site  • Negative: Symptoms arising from use of a urinary catheter (e.g., Coude, Irene)  • Negative: Cast problems or questions  • Negative: Medication question  • Negative: [1] Widespread rash AND [2] bright red, sunburn-like  • Negative: [1] SEVERE headache AND [2] after spinal (epidural) anesthesia  • Negative: [1] Vomiting AND [2] persists > 4 hours  • Negative: [1] Vomiting AND [2] abdomen looks much more swollen than usual  • Negative: [1] Drinking very little AND [2] dehydration suspected (e.g., no urine > 12 hours, very dry mouth, very lightheaded)  • Negative: Patient sounds very sick or weak to the triager  • Negative: Sounds like a serious complication to the triager  • Negative: Fever > 100.4 F (38.0 C)  • Negative: [1] SEVERE post-op pain (e.g., excruciating, pain scale 8-10) AND [2] not controlled with pain medications  • Negative: [1] Caller has URGENT question AND [2] triager unable to answer question  • Negative: [1] Headache AND [2] after spinal (epidural) anesthesia AND [3] not severe  • Negative: Fever present > 3 days (72 hours)  •  "Negative: [1] MILD-MODERATE post-op pain (e.g., pain scale 1-7) AND [2] not controlled with pain medications  • Negative: General activity, questions about    Answer Assessment - Initial Assessment Questions  1. SYMPTOM: \"What's the main symptom you're concerned about?\" (e.g., pain, fever, vomiting)      Stool is yellowish since had gall bladder removed 2 weeks ago.  2. ONSET: \"When did yellow stool  start?\"      Since her surgery  3. SURGERY: \"What surgery did you have?\"      Gall bladder surgery  4. DATE of SURGERY: \"When was the surgery?\"       2 weeks ago  5. ANESTHESIA: \" What type of anesthesia did you have?\" (e.g., general, spinal, epidural, local)      general  6. PAIN: \"Is there any pain?\" If Yes, ask: \"How bad is it?\"  (Scale 1-10; or mild, moderate, severe)      no  7. FEVER: \"Do you have a fever?\" If Yes, ask: \"What is your temperature, how was it measured, and when did it start?\"      no  8. VOMITING: \"Is there any vomiting?\" If Yes, ask: \"How many times?\"      no  9. BLEEDING: \"Is there any bleeding?\" If Yes, ask: \"How much?\" and \"Where?\"      no  10. OTHER SYMPTOMS: \"Do you have any other symptoms?\" (e.g., drainage from wound, painful urination, constipation)        no    Protocols used: Post-Op Symptoms and Questions-ADULT-    "

## 2023-11-07 NOTE — TELEPHONE ENCOUNTER
Tamra has had yellowish stools since her gall bladder was removed 2 weeks ago.  She saw the surgeon today but did not ask about change in stool color.  Will call and speak with office nurse tomorrow.

## 2024-05-28 ENCOUNTER — HOSPITAL ENCOUNTER (EMERGENCY)
Age: 39
Discharge: HOME | End: 2024-05-28
Payer: MEDICARE

## 2024-05-28 ENCOUNTER — APPOINTMENT (OUTPATIENT)
Dept: GENERAL RADIOLOGY | Age: 39
End: 2024-05-28
Payer: MEDICARE

## 2024-05-28 VITALS
BODY MASS INDEX: 36.96 KG/M2 | OXYGEN SATURATION: 95 % | HEART RATE: 84 BPM | SYSTOLIC BLOOD PRESSURE: 136 MMHG | RESPIRATION RATE: 18 BRPM | WEIGHT: 236 LBS | TEMPERATURE: 97.2 F | DIASTOLIC BLOOD PRESSURE: 96 MMHG

## 2024-05-28 DIAGNOSIS — R07.9 CHEST PAIN, UNSPECIFIED TYPE: Primary | ICD-10-CM

## 2024-05-28 LAB
ALBUMIN SERPL-MCNC: 4.8 G/DL (ref 3.5–5.2)
ALP SERPL-CCNC: 124 U/L (ref 35–104)
ALT SERPL-CCNC: 24 U/L (ref 5–33)
ANION GAP SERPL CALCULATED.3IONS-SCNC: 14 MMOL/L (ref 7–19)
AST SERPL-CCNC: 25 U/L (ref 5–32)
BASOPHILS # BLD: 0.1 K/UL (ref 0–0.2)
BASOPHILS NFR BLD: 0.8 % (ref 0–1)
BILIRUB SERPL-MCNC: 0.2 MG/DL (ref 0.2–1.2)
BNP BLD-MCNC: <36 PG/ML (ref 0–124)
BUN SERPL-MCNC: 12 MG/DL (ref 6–20)
CALCIUM SERPL-MCNC: 9.5 MG/DL (ref 8.6–10)
CHLORIDE SERPL-SCNC: 105 MMOL/L (ref 98–111)
CO2 SERPL-SCNC: 23 MMOL/L (ref 22–29)
CREAT SERPL-MCNC: 0.7 MG/DL (ref 0.5–0.9)
EKG P AXIS: 38 DEGREES
EKG P-R INTERVAL: 180 MS
EKG Q-T INTERVAL: 350 MS
EKG QRS DURATION: 82 MS
EKG QTC CALCULATION (BAZETT): 409 MS
EKG T AXIS: 49 DEGREES
EOSINOPHIL # BLD: 0.2 K/UL (ref 0–0.6)
EOSINOPHIL NFR BLD: 1.4 % (ref 0–5)
ERYTHROCYTE [DISTWIDTH] IN BLOOD BY AUTOMATED COUNT: 13.3 % (ref 11.5–14.5)
GLUCOSE SERPL-MCNC: 154 MG/DL (ref 74–109)
HCG SERPL QL: NEGATIVE
HCT VFR BLD AUTO: 43.5 % (ref 37–47)
HGB BLD-MCNC: 14.9 G/DL (ref 12–16)
IMM GRANULOCYTES # BLD: 0.1 K/UL
LYMPHOCYTES # BLD: 3 K/UL (ref 1.1–4.5)
LYMPHOCYTES NFR BLD: 21.9 % (ref 20–40)
MCH RBC QN AUTO: 31.8 PG (ref 27–31)
MCHC RBC AUTO-ENTMCNC: 34.3 G/DL (ref 33–37)
MCV RBC AUTO: 92.8 FL (ref 81–99)
MONOCYTES # BLD: 0.9 K/UL (ref 0–0.9)
MONOCYTES NFR BLD: 6.3 % (ref 0–10)
NEUTROPHILS # BLD: 9.5 K/UL (ref 1.5–7.5)
NEUTS SEG NFR BLD: 69.2 % (ref 50–65)
PLATELET # BLD AUTO: 401 K/UL (ref 130–400)
PMV BLD AUTO: 9.1 FL (ref 9.4–12.3)
POTASSIUM SERPL-SCNC: 3.9 MMOL/L (ref 3.5–5)
PROT SERPL-MCNC: 8.2 G/DL (ref 6.6–8.7)
RBC # BLD AUTO: 4.69 M/UL (ref 4.2–5.4)
SODIUM SERPL-SCNC: 142 MMOL/L (ref 136–145)
TROPONIN, HIGH SENSITIVITY: 6 NG/L (ref 0–14)
TROPONIN, HIGH SENSITIVITY: <6 NG/L (ref 0–14)
WBC # BLD AUTO: 13.8 K/UL (ref 4.8–10.8)

## 2024-05-28 PROCEDURE — 36415 COLL VENOUS BLD VENIPUNCTURE: CPT

## 2024-05-28 PROCEDURE — 85025 COMPLETE CBC W/AUTO DIFF WBC: CPT

## 2024-05-28 PROCEDURE — 84484 ASSAY OF TROPONIN QUANT: CPT

## 2024-05-28 PROCEDURE — 80053 COMPREHEN METABOLIC PANEL: CPT

## 2024-05-28 PROCEDURE — 83880 ASSAY OF NATRIURETIC PEPTIDE: CPT

## 2024-05-28 PROCEDURE — 84703 CHORIONIC GONADOTROPIN ASSAY: CPT

## 2024-05-28 PROCEDURE — 93005 ELECTROCARDIOGRAM TRACING: CPT | Performed by: EMERGENCY MEDICINE

## 2024-05-28 PROCEDURE — 71045 X-RAY EXAM CHEST 1 VIEW: CPT

## 2024-05-28 PROCEDURE — 99285 EMERGENCY DEPT VISIT HI MDM: CPT

## 2024-05-28 PROCEDURE — 93010 ELECTROCARDIOGRAM REPORT: CPT | Performed by: INTERNAL MEDICINE

## 2024-05-28 ASSESSMENT — ENCOUNTER SYMPTOMS
SHORTNESS OF BREATH: 1
DIARRHEA: 0
ABDOMINAL PAIN: 0
VOMITING: 0
BACK PAIN: 1
NAUSEA: 1
COUGH: 0

## 2024-05-28 NOTE — ED PROVIDER NOTES
[facility] EMERGENCY DEPT  eMERGENCY dEPARTMENT eNCOUnter      Pt Name: Karen Lazaro  MRN: [id number]  Birthdate 1985  Date of evaluation: 2024  Provider: MANNY Aponte    CHIEF COMPLAINT       Chief Complaint   Patient presents with    Chest Pain     X1 week         HISTORY OF PRESENT ILLNESS   (Location/Symptom, Timing/Onset,Context/Setting, Quality, Duration, Modifying Factors, Severity)  Note limiting factors.   Karen Lazaro is a 38 y.o. female with history including diabetes, multiple sclerosis, pancreatitis, and seizures who presents to the emergency department with complaint of chest pain.  The patient notes intermittent pain for the last 1 week.  When she has pain, it is left-sided and radiates to her shoulder, left shoulder blade, and upper neck.  She notes that she will feel very dizzy during these episodes.  She will have shortness of breath.  She has had nausea without vomiting.  She does not take blood thinning medication.  She notes family history with her  father having an extra valve in his heart and a heart attack in his 60s.  She also notes her mother had a heart attack approximately 1 year ago.    NursingNotes were reviewed.    REVIEW OF SYSTEMS    (2-9 systems for level 4, 10 or more for level 5)     Review of Systems   Constitutional:  Positive for fatigue. Negative for chills and fever.   HENT:  Negative for congestion.    Respiratory:  Positive for shortness of breath. Negative for cough.    Cardiovascular:  Positive for chest pain. Negative for leg swelling.   Gastrointestinal:  Positive for nausea. Negative for abdominal pain, diarrhea and vomiting.   Musculoskeletal:  Positive for arthralgias (Left shoulder), back pain and neck pain.   Neurological:  Positive for dizziness. Negative for weakness, numbness and headaches.   All other systems reviewed and are negative.           PAST MEDICALHISTORY     Past Medical History:   Diagnosis Date    Asthma     Blind right eye

## 2024-05-31 ENCOUNTER — TELEPHONE (OUTPATIENT)
Dept: CARDIOLOGY CLINIC | Age: 39
End: 2024-05-31

## 2024-06-27 ENCOUNTER — APPOINTMENT (OUTPATIENT)
Dept: CT IMAGING | Facility: HOSPITAL | Age: 39
End: 2024-06-27
Payer: MEDICARE

## 2024-06-27 ENCOUNTER — HOSPITAL ENCOUNTER (EMERGENCY)
Facility: HOSPITAL | Age: 39
Discharge: HOME | End: 2024-06-27
Attending: EMERGENCY MEDICINE
Payer: MEDICARE

## 2024-06-27 VITALS
TEMPERATURE: 98.1 F | SYSTOLIC BLOOD PRESSURE: 132 MMHG | WEIGHT: 220 LBS | BODY MASS INDEX: 33.34 KG/M2 | DIASTOLIC BLOOD PRESSURE: 96 MMHG | RESPIRATION RATE: 18 BRPM | HEIGHT: 68 IN | HEART RATE: 76 BPM | OXYGEN SATURATION: 95 %

## 2024-06-27 DIAGNOSIS — N39.0 ACUTE UTI (URINARY TRACT INFECTION): ICD-10-CM

## 2024-06-27 DIAGNOSIS — N20.1 URETERIC CALCULUS: Primary | ICD-10-CM

## 2024-06-27 DIAGNOSIS — R73.9 HYPERGLYCEMIA: ICD-10-CM

## 2024-06-27 LAB
ALBUMIN SERPL-MCNC: 4.5 G/DL (ref 3.5–5.2)
ALBUMIN/GLOB SERPL: 1.5 G/DL
ALP SERPL-CCNC: 111 U/L (ref 39–117)
ALT SERPL W P-5'-P-CCNC: 19 U/L (ref 1–33)
ANION GAP SERPL CALCULATED.3IONS-SCNC: 16 MMOL/L (ref 5–15)
AST SERPL-CCNC: 18 U/L (ref 1–32)
ATMOSPHERIC PRESS: 751 MMHG
BACTERIA UR QL AUTO: ABNORMAL /HPF
BASE EXCESS BLDV CALC-SCNC: -0.9 MMOL/L (ref 0–2)
BASOPHILS # BLD AUTO: 0.1 10*3/MM3 (ref 0–0.2)
BASOPHILS NFR BLD AUTO: 0.7 % (ref 0–1.5)
BDY SITE: ABNORMAL
BILIRUB SERPL-MCNC: 0.4 MG/DL (ref 0–1.2)
BILIRUB UR QL STRIP: NEGATIVE
BODY TEMPERATURE: 37
BUN SERPL-MCNC: 14 MG/DL (ref 6–20)
BUN/CREAT SERPL: 17.1 (ref 7–25)
CALCIUM SPEC-SCNC: 9.6 MG/DL (ref 8.6–10.5)
CHLORIDE SERPL-SCNC: 99 MMOL/L (ref 98–107)
CLARITY UR: ABNORMAL
CO2 SERPL-SCNC: 20 MMOL/L (ref 22–29)
COLOR UR: YELLOW
CREAT SERPL-MCNC: 0.82 MG/DL (ref 0.57–1)
D-LACTATE SERPL-SCNC: 1.6 MMOL/L (ref 0.5–2)
DEPRECATED RDW RBC AUTO: 42.7 FL (ref 37–54)
EGFRCR SERPLBLD CKD-EPI 2021: 94.6 ML/MIN/1.73
EOSINOPHIL # BLD AUTO: 0.04 10*3/MM3 (ref 0–0.4)
EOSINOPHIL NFR BLD AUTO: 0.3 % (ref 0.3–6.2)
ERYTHROCYTE [DISTWIDTH] IN BLOOD BY AUTOMATED COUNT: 13.2 % (ref 12.3–15.4)
GLOBULIN UR ELPH-MCNC: 3 GM/DL
GLUCOSE SERPL-MCNC: 255 MG/DL (ref 65–99)
GLUCOSE UR STRIP-MCNC: ABNORMAL MG/DL
HCG SERPL QL: NEGATIVE
HCO3 BLDV-SCNC: 24.6 MMOL/L (ref 22–28)
HCT VFR BLD AUTO: 38.3 % (ref 34–46.6)
HGB BLD-MCNC: 13.5 G/DL (ref 12–15.9)
HGB UR QL STRIP.AUTO: ABNORMAL
HYALINE CASTS UR QL AUTO: ABNORMAL /LPF
IMM GRANULOCYTES # BLD AUTO: 0.09 10*3/MM3 (ref 0–0.05)
IMM GRANULOCYTES NFR BLD AUTO: 0.6 % (ref 0–0.5)
KETONES UR QL STRIP: ABNORMAL
LEUKOCYTE ESTERASE UR QL STRIP.AUTO: ABNORMAL
LYMPHOCYTES # BLD AUTO: 2.7 10*3/MM3 (ref 0.7–3.1)
LYMPHOCYTES NFR BLD AUTO: 18.2 % (ref 19.6–45.3)
Lab: ABNORMAL
MCH RBC QN AUTO: 31.6 PG (ref 26.6–33)
MCHC RBC AUTO-ENTMCNC: 35.2 G/DL (ref 31.5–35.7)
MCV RBC AUTO: 89.7 FL (ref 79–97)
MODALITY: ABNORMAL
MONOCYTES # BLD AUTO: 1.09 10*3/MM3 (ref 0.1–0.9)
MONOCYTES NFR BLD AUTO: 7.3 % (ref 5–12)
MUCOUS THREADS URNS QL MICRO: ABNORMAL /HPF
NEUTROPHILS NFR BLD AUTO: 10.81 10*3/MM3 (ref 1.7–7)
NEUTROPHILS NFR BLD AUTO: 72.9 % (ref 42.7–76)
NITRITE UR QL STRIP: NEGATIVE
NRBC BLD AUTO-RTO: 0 /100 WBC (ref 0–0.2)
PCO2 BLDV: 43.1 MM HG (ref 41–51)
PH BLDV: 7.37 PH UNITS (ref 7.32–7.42)
PH UR STRIP.AUTO: 6 [PH] (ref 5–8)
PLATELET # BLD AUTO: 409 10*3/MM3 (ref 140–450)
PMV BLD AUTO: 9.3 FL (ref 6–12)
PO2 BLDV: 32 MM HG (ref 27–53)
POTASSIUM SERPL-SCNC: 3.4 MMOL/L (ref 3.5–5.2)
PROCALCITONIN SERPL-MCNC: 0.03 NG/ML (ref 0–0.25)
PROT SERPL-MCNC: 7.5 G/DL (ref 6–8.5)
PROT UR QL STRIP: ABNORMAL
RBC # BLD AUTO: 4.27 10*6/MM3 (ref 3.77–5.28)
RBC # UR STRIP: ABNORMAL /HPF
REF LAB TEST METHOD: ABNORMAL
SAO2 % BLDCOV: 61.5 % (ref 45–75)
SODIUM SERPL-SCNC: 135 MMOL/L (ref 136–145)
SP GR UR STRIP: 1.02 (ref 1–1.03)
SQUAMOUS #/AREA URNS HPF: ABNORMAL /HPF
TROPONIN T SERPL HS-MCNC: <6 NG/L
UROBILINOGEN UR QL STRIP: ABNORMAL
VENTILATOR MODE: ABNORMAL
WBC # UR STRIP: ABNORMAL /HPF
WBC NRBC COR # BLD AUTO: 14.83 10*3/MM3 (ref 3.4–10.8)

## 2024-06-27 PROCEDURE — 81001 URINALYSIS AUTO W/SCOPE: CPT | Performed by: EMERGENCY MEDICINE

## 2024-06-27 PROCEDURE — 93005 ELECTROCARDIOGRAM TRACING: CPT | Performed by: EMERGENCY MEDICINE

## 2024-06-27 PROCEDURE — 84703 CHORIONIC GONADOTROPIN ASSAY: CPT | Performed by: EMERGENCY MEDICINE

## 2024-06-27 PROCEDURE — 0 HYDROMORPHONE 1 MG/ML SOLUTION: Performed by: EMERGENCY MEDICINE

## 2024-06-27 PROCEDURE — 82803 BLOOD GASES ANY COMBINATION: CPT

## 2024-06-27 PROCEDURE — 74176 CT ABD & PELVIS W/O CONTRAST: CPT

## 2024-06-27 PROCEDURE — 80053 COMPREHEN METABOLIC PANEL: CPT | Performed by: EMERGENCY MEDICINE

## 2024-06-27 PROCEDURE — 84145 PROCALCITONIN (PCT): CPT | Performed by: EMERGENCY MEDICINE

## 2024-06-27 PROCEDURE — 83605 ASSAY OF LACTIC ACID: CPT | Performed by: EMERGENCY MEDICINE

## 2024-06-27 PROCEDURE — 96374 THER/PROPH/DIAG INJ IV PUSH: CPT

## 2024-06-27 PROCEDURE — P9612 CATHETERIZE FOR URINE SPEC: HCPCS

## 2024-06-27 PROCEDURE — 99284 EMERGENCY DEPT VISIT MOD MDM: CPT

## 2024-06-27 PROCEDURE — 96375 TX/PRO/DX INJ NEW DRUG ADDON: CPT

## 2024-06-27 PROCEDURE — 85025 COMPLETE CBC W/AUTO DIFF WBC: CPT | Performed by: EMERGENCY MEDICINE

## 2024-06-27 PROCEDURE — 93010 ELECTROCARDIOGRAM REPORT: CPT | Performed by: STUDENT IN AN ORGANIZED HEALTH CARE EDUCATION/TRAINING PROGRAM

## 2024-06-27 PROCEDURE — 25810000003 LACTATED RINGERS SOLUTION: Performed by: EMERGENCY MEDICINE

## 2024-06-27 PROCEDURE — 25010000002 ONDANSETRON PER 1 MG: Performed by: EMERGENCY MEDICINE

## 2024-06-27 PROCEDURE — 25810000003 SODIUM CHLORIDE 0.9 % SOLUTION: Performed by: EMERGENCY MEDICINE

## 2024-06-27 PROCEDURE — 84484 ASSAY OF TROPONIN QUANT: CPT | Performed by: EMERGENCY MEDICINE

## 2024-06-27 RX ORDER — ONDANSETRON 2 MG/ML
4 INJECTION INTRAMUSCULAR; INTRAVENOUS ONCE
Status: COMPLETED | OUTPATIENT
Start: 2024-06-27 | End: 2024-06-27

## 2024-06-27 RX ORDER — OXYCODONE AND ACETAMINOPHEN 7.5; 325 MG/1; MG/1
1 TABLET ORAL EVERY 6 HOURS PRN
Qty: 6 TABLET | Refills: 0 | Status: SHIPPED | OUTPATIENT
Start: 2024-06-27

## 2024-06-27 RX ORDER — CEFDINIR 300 MG/1
300 CAPSULE ORAL 2 TIMES DAILY
Qty: 20 CAPSULE | Refills: 0 | Status: SHIPPED | OUTPATIENT
Start: 2024-06-27 | End: 2024-07-07

## 2024-06-27 RX ORDER — ONDANSETRON 4 MG/1
4 TABLET, FILM COATED ORAL EVERY 6 HOURS
Qty: 15 TABLET | Refills: 0 | Status: SHIPPED | OUTPATIENT
Start: 2024-06-27

## 2024-06-27 RX ORDER — KETOROLAC TROMETHAMINE 10 MG/1
10 TABLET, FILM COATED ORAL EVERY 6 HOURS PRN
Qty: 10 TABLET | Refills: 0 | Status: SHIPPED | OUTPATIENT
Start: 2024-06-27

## 2024-06-27 RX ADMIN — ONDANSETRON 4 MG: 2 INJECTION INTRAMUSCULAR; INTRAVENOUS at 10:20

## 2024-06-27 RX ADMIN — SODIUM CHLORIDE 1000 ML: 9 INJECTION, SOLUTION INTRAVENOUS at 10:20

## 2024-06-27 RX ADMIN — SODIUM CHLORIDE, POTASSIUM CHLORIDE, SODIUM LACTATE AND CALCIUM CHLORIDE 1000 ML: 600; 310; 30; 20 INJECTION, SOLUTION INTRAVENOUS at 11:46

## 2024-06-27 RX ADMIN — HYDROMORPHONE HYDROCHLORIDE 1 MG: 1 INJECTION, SOLUTION INTRAMUSCULAR; INTRAVENOUS; SUBCUTANEOUS at 10:20

## 2024-06-27 NOTE — ED PROVIDER NOTES
Subjective   History of Present Illness  38-year-old with right flank pain was seen in the the hospital locally but wants a second opinion.    Flank Pain  Pain location:  R flank  Pain quality: aching, bloating, burning, dull and stabbing    Pain radiates to:  Groin  Pain severity:  Moderate  Onset quality:  Sudden  Timing:  Constant  Progression:  Worsening  Chronicity:  New  Context: not alcohol use, not awakening from sleep, not diet changes, not eating, not previous surgeries, not recent illness, not recent sexual activity, not sick contacts, not suspicious food intake and not trauma    Relieved by:  Nothing  Worsened by:  Nothing  Ineffective treatments:  None tried  Associated symptoms: nausea and vomiting    Associated symptoms: no anorexia, no belching, no chills, no constipation, no cough, no fever, no flatus, no hematemesis, no hematochezia, no sore throat and no vaginal bleeding    Risk factors: no alcohol abuse, has not had multiple surgeries, no NSAID use and not obese        Review of Systems   Constitutional: Negative.  Negative for chills and fever.   HENT: Negative.  Negative for sore throat.    Eyes: Negative.    Respiratory: Negative.  Negative for cough.    Cardiovascular: Negative.    Gastrointestinal:  Positive for nausea and vomiting. Negative for anorexia, constipation, flatus, hematemesis and hematochezia.   Endocrine: Negative.    Genitourinary:  Positive for flank pain. Negative for vaginal bleeding.   Skin: Negative.    Neurological: Negative.    Hematological: Negative.    All other systems reviewed and are negative.      Past Medical History:   Diagnosis Date    Devic's disease     Diabetes mellitus        Allergies   Allergen Reactions    Bactrim [Sulfamethoxazole-Trimethoprim] Hives    Iodine Swelling     Blisters      Latex Itching and Swelling    Dexamethasone Unknown (See Comments)     No steroids, pt becomes hypotensive       Past Surgical History:   Procedure Laterality Date      SECTION      x 2    CHOLECYSTECTOMY      TONSILLECTOMY      TUBAL ABDOMINAL LIGATION         Family History   Problem Relation Age of Onset    Diabetes Mother     Diabetes Father     Diabetes Sister     No Known Problems Maternal Grandmother     No Known Problems Maternal Grandfather     No Known Problems Paternal Grandmother     No Known Problems Paternal Grandfather        Social History     Socioeconomic History    Marital status: Single   Tobacco Use    Smoking status: Every Day     Current packs/day: 1.00     Average packs/day: 1 pack/day for 15.0 years (15.0 ttl pk-yrs)     Types: Cigarettes    Smokeless tobacco: Never   Substance and Sexual Activity    Alcohol use: No    Drug use: No    Sexual activity: Defer           Objective   Physical Exam  Vitals and nursing note reviewed. Exam conducted with a chaperone present.   Constitutional:       General: She is awake. She is not in acute distress.     Appearance: Normal appearance. She is well-developed. She is not toxic-appearing.   HENT:      Head: Normocephalic and atraumatic.      Nose:      Right Nostril: No epistaxis.      Left Nostril: No epistaxis.   Eyes:      General: Lids are normal. No scleral icterus.     Conjunctiva/sclera: Conjunctivae normal.      Pupils: Pupils are equal, round, and reactive to light.   Neck:      Vascular: No hepatojugular reflux or JVD.   Cardiovascular:      Rate and Rhythm: Normal rate and regular rhythm.      Chest Wall: PMI is not displaced.      Pulses: Normal pulses. No decreased pulses.      Heart sounds: Normal heart sounds. No murmur heard.  Pulmonary:      Effort: Pulmonary effort is normal. No accessory muscle usage or respiratory distress.      Breath sounds: Normal breath sounds. No decreased breath sounds or wheezing.   Abdominal:      General: Abdomen is flat. Bowel sounds are normal. There is no distension or abdominal bruit.      Palpations: Abdomen is soft. There is no shifting dullness, fluid wave,  mass or pulsatile mass.      Tenderness: There is abdominal tenderness. There is no right CVA tenderness, left CVA tenderness, guarding or rebound.      Hernia: No hernia is present.   Musculoskeletal:         General: Normal range of motion.      Cervical back: Normal range of motion and neck supple. No rigidity.      Right lower leg: No edema.      Left lower leg: No edema.   Skin:     General: Skin is warm and dry.      Capillary Refill: Capillary refill takes less than 2 seconds.      Coloration: Skin is not cyanotic, jaundiced, mottled or pale.   Neurological:      General: No focal deficit present.      Mental Status: She is alert and oriented to person, place, and time. Mental status is at baseline.      GCS: GCS eye subscore is 4. GCS verbal subscore is 5. GCS motor subscore is 6.      Cranial Nerves: No cranial nerve deficit.      Sensory: Sensation is intact.      Motor: Motor function is intact.      Deep Tendon Reflexes: Reflexes are normal and symmetric.   Psychiatric:         Behavior: Behavior normal. Behavior is cooperative.         Procedures           ED Course  ED Course as of 06/27/24 1319   Thu Jun 27, 2024   1135 EKG showed normal sinus rhythm [TS]   1135 Was complaining of the abdominal pain going to her chest therefore his EKG was obtained [TS]   1136 CT scan shows ureteric calculus [TS]   1314 Patient came to the ER with flank pain.  CT scan showed Viatrac calculus.  Lab work essentially unremarkable has got a slight amount of UTI elevated blood sugar bicarb 20 with minimal anion gap of 16.  VBG is normal there is no evidence of any acidosis.  I have discussed this case with the patient there is no clinical suspicion of pyelonephritis.  The patient feels better at this time not running any fever and is going be discharged home with a follow-up with the primary MD and to return there for any worsening symptoms and follow-up with urology. [TS]      ED Course User Index  [TS] Joao Lee MD                                              Medical Decision Making  DD   mesenteric lymphadenitis, diverticulitis, intussusception, small bowel obstruction, adhesions, bowel ischemia,intraabdominal abscess, spontaneous bacterial peritonitis, hernia, urinary tract infection, ureterolithiasis,acute appendicitis, abdominal aortic aneurysm, pneumonia, porphyria and diabetic ketoacidosis as a possible cause of abdominal pain in this patient. This is a partial list of diagnoses considered.    Came in with flank pain CT shows ureteric calculus with mild obstructive uropathy.  Has got a UTI no evidence of sepsis and no pyelonephritis.  I have discussed this case with the patient and the patient will discharge home with a follow the primary MD on p.o. antibiotics and pain medication patient agreeable with this plan of care.    Problems Addressed:  Acute UTI (urinary tract infection): acute illness or injury  Hyperglycemia: acute illness or injury  Ureteric calculus: acute illness or injury    Amount and/or Complexity of Data Reviewed  Labs: ordered.     Details: Labs reviewed  Radiology: ordered.     Details: Scans reviewed  ECG/medicine tests: ordered.    Risk  Prescription drug management.  Risk Details: This patient presents with abdominal pain arrived hemodynamically stable.  Presentation not consistent with other acute, emergent causes of abdominal pain at this time.  Low suspicion for dissection there is no widened mediastinum, hypotension, pulses deficits, and no pain tearing through to the back.   Low suspicion for viscus perforation without evidence of guarding, rebound, or rigidity that would be concerning for an acute abdomen.  Low concern for appendicitis as pain did not radiate from the umbilicus to the right lower quadrant, negative Rovsing's sign, no severe constipation on exam.  Low concern for cholecystitis with negative Andres sign, no history of gallstones, and no recent pale stools or pain after eating.   Low concern for ulcerative disease as pain is not consistent with eating  foods and is not relieved with patient refraining from eatingand there is no hematemesis or melena. Low suspicion for GI bleeding as there is no melena hematemesis or hematochezia and patient's hemodynamics are stable and hemoglobin is at its baseline.  Low suspicion for gastroenteritis without evidence of diarrhea, fevers, or recent uncooked food exposure.  There is low concern for ischemic bowel with no significant abdominal pain normal vitals and no acidosis no history of peripheral vascular disease or cardiac dysrhythmias.                Final diagnoses:   Ureteric calculus   Acute UTI (urinary tract infection)   Hyperglycemia       ED Disposition  ED Disposition       ED Disposition   Discharge    Condition   Stable    Comment   --               Evie Shaw, APRN  [address] [facility] [id number] [phone]    In 2 days           Medication List        New Prescriptions      cefdinir 300 MG capsule  Commonly known as: OMNICEF  Take 1 capsule by mouth 2 (Two) Times a Day for 10 days.     ketorolac 10 MG tablet  Commonly known as: TORADOL  Take 1 tablet by mouth Every 6 (Six) Hours As Needed for Moderate Pain.     ondansetron 4 MG tablet  Commonly known as: ZOFRAN  Take 1 tablet by mouth Every 6 (Six) Hours.     oxyCODONE-acetaminophen 7.5-325 MG per tablet  Commonly known as: PERCOCET  Take 1 tablet by mouth Every 6 (Six) Hours As Needed for Moderate Pain for up to 6 doses.               Where to Get Your Medications        These medications were sent to [facility] - [place], [place] - [address] [place] [address] - [phone] [facility] [phone] [address] [address]      Phone: [phone]   cefdinir 300 MG capsule  ketorolac 10 MG tablet  ondansetron 4 MG tablet  oxyCODONE-acetaminophen 7.5-325 MG per tablet            Joao Lee MD  06/27/24 1097       Joao Lee MD  06/27/24 0862

## 2024-06-27 NOTE — DISCHARGE INSTRUCTIONS
It was very nice to meet you, . Thank you for allowing us to take care of you today at [facility].     Today you were seen in the emergency department for your symptoms. Please understand that an ER evaluation is just the start of your evaluation. We do the best we can, but we are often unable to fully find what is causing your symptoms from one evaluation.  Because of this, the goal is to determine whether you need to be evaluated in the hospital or if it is safe for you to go home and see other doctors provided such as primary care physicians or specialist on an outpatient basis.      Like we discussed, I strongly urge that you follow up with your primary care doctor. Please call their office to set up an appointment as soon as possible so that you can be re-evaluated for improvement in your symptoms or for any other questions.  I have provided the information needed, including phone number, to call to set up an appointment below in these discharge papers.      Educational material has also been provided in the following pages regarding what we have discussed today.  Please follow-up with urology.  You do not have any clinical evidence of sepsis at this time.  Will be given antibiotics please take the medication as prescribed and return there for any worsening symptoms and keep a close check on your blood sugar.     Please return to the emergency room within 12-48 hours if you experience symptoms such as the following:   Fever, chills, chest pain or shortness of breath, pain with inspiration/expiration, pain that travels to your arms, neck or back, nausea, vomiting, severe headache, tearing pain in your chest, dizziness, feel as though you are about to pass out, OR if you have any worsening symptoms, or any other concerns.

## 2024-06-29 LAB
QT INTERVAL: 398 MS
QTC INTERVAL: 429 MS

## 2024-07-17 ENCOUNTER — OFFICE VISIT (OUTPATIENT)
Dept: CARDIOLOGY CLINIC | Age: 39
End: 2024-07-17
Payer: MEDICARE

## 2024-07-17 VITALS
HEART RATE: 95 BPM | BODY MASS INDEX: 35.31 KG/M2 | SYSTOLIC BLOOD PRESSURE: 118 MMHG | OXYGEN SATURATION: 97 % | WEIGHT: 225 LBS | DIASTOLIC BLOOD PRESSURE: 86 MMHG | HEIGHT: 67 IN

## 2024-07-17 DIAGNOSIS — E11.69 TYPE 2 DIABETES MELLITUS WITH OTHER SPECIFIED COMPLICATION, WITHOUT LONG-TERM CURRENT USE OF INSULIN (HCC): ICD-10-CM

## 2024-07-17 DIAGNOSIS — F43.9 SITUATIONAL STRESS: ICD-10-CM

## 2024-07-17 DIAGNOSIS — Z82.49 FAMILY HISTORY OF HEART DISEASE: ICD-10-CM

## 2024-07-17 DIAGNOSIS — F17.218 CIGARETTE NICOTINE DEPENDENCE WITH OTHER NICOTINE-INDUCED DISORDER: ICD-10-CM

## 2024-07-17 DIAGNOSIS — E78.2 MIXED HYPERLIPIDEMIA: ICD-10-CM

## 2024-07-17 DIAGNOSIS — I10 PRIMARY HYPERTENSION: ICD-10-CM

## 2024-07-17 DIAGNOSIS — R07.89 OTHER CHEST PAIN: Primary | ICD-10-CM

## 2024-07-17 PROCEDURE — 3079F DIAST BP 80-89 MM HG: CPT | Performed by: CLINICAL NURSE SPECIALIST

## 2024-07-17 PROCEDURE — G8427 DOCREV CUR MEDS BY ELIG CLIN: HCPCS | Performed by: CLINICAL NURSE SPECIALIST

## 2024-07-17 PROCEDURE — 2022F DILAT RTA XM EVC RTNOPTHY: CPT | Performed by: CLINICAL NURSE SPECIALIST

## 2024-07-17 PROCEDURE — 4004F PT TOBACCO SCREEN RCVD TLK: CPT | Performed by: CLINICAL NURSE SPECIALIST

## 2024-07-17 PROCEDURE — 3074F SYST BP LT 130 MM HG: CPT | Performed by: CLINICAL NURSE SPECIALIST

## 2024-07-17 PROCEDURE — 3046F HEMOGLOBIN A1C LEVEL >9.0%: CPT | Performed by: CLINICAL NURSE SPECIALIST

## 2024-07-17 PROCEDURE — 99204 OFFICE O/P NEW MOD 45 MIN: CPT | Performed by: CLINICAL NURSE SPECIALIST

## 2024-07-17 PROCEDURE — G8417 CALC BMI ABV UP PARAM F/U: HCPCS | Performed by: CLINICAL NURSE SPECIALIST

## 2024-07-17 RX ORDER — CARISOPRODOL 350 MG/1
350 TABLET ORAL DAILY PRN
COMMUNITY
Start: 2024-06-26

## 2024-07-17 RX ORDER — SEMAGLUTIDE 1.34 MG/ML
1 INJECTION, SOLUTION SUBCUTANEOUS
COMMUNITY
Start: 2024-07-15

## 2024-07-17 RX ORDER — AZATHIOPRINE 50 MG/1
100 TABLET ORAL 2 TIMES DAILY
COMMUNITY
Start: 2024-06-26

## 2024-07-17 RX ORDER — TIZANIDINE 4 MG/1
8 TABLET ORAL 2 TIMES DAILY
COMMUNITY

## 2024-07-17 RX ORDER — NITROGLYCERIN 0.4 MG/1
0.4 TABLET SUBLINGUAL EVERY 5 MIN PRN
Qty: 25 TABLET | Refills: 3 | Status: SHIPPED | OUTPATIENT
Start: 2024-07-17

## 2024-07-17 RX ORDER — GABAPENTIN 600 MG/1
600 TABLET ORAL 2 TIMES DAILY
COMMUNITY

## 2024-07-17 RX ORDER — LORAZEPAM 1 MG/1
1 TABLET ORAL EVERY 6 HOURS PRN
COMMUNITY

## 2024-07-17 RX ORDER — LAMOTRIGINE 25 MG/1
25 TABLET ORAL DAILY
COMMUNITY
Start: 2024-06-26

## 2024-07-17 RX ORDER — PROMETHAZINE HYDROCHLORIDE 12.5 MG/1
12.5 TABLET ORAL EVERY 6 HOURS PRN
COMMUNITY
Start: 2024-04-22

## 2024-07-17 RX ORDER — GALCANEZUMAB 120 MG/ML
1 INJECTION, SOLUTION SUBCUTANEOUS
COMMUNITY
Start: 2024-06-26

## 2024-07-17 RX ORDER — PRAVASTATIN SODIUM 80 MG/1
80 TABLET ORAL DAILY
COMMUNITY
Start: 2024-07-01

## 2024-07-17 RX ORDER — PROPRANOLOL HYDROCHLORIDE 40 MG/1
40 TABLET ORAL DAILY
COMMUNITY
Start: 2024-06-26

## 2024-07-17 RX ORDER — INSULIN LISPRO 100 [IU]/ML
INJECTION, SOLUTION INTRAVENOUS; SUBCUTANEOUS 3 TIMES DAILY
COMMUNITY
Start: 2024-07-01

## 2024-07-17 RX ORDER — OMEPRAZOLE 40 MG/1
40 CAPSULE, DELAYED RELEASE ORAL DAILY
COMMUNITY
Start: 2024-07-01

## 2024-07-17 ASSESSMENT — ENCOUNTER SYMPTOMS
FACIAL SWELLING: 0
COUGH: 0
VOMITING: 0
CHEST TIGHTNESS: 0
WHEEZING: 0
EYE REDNESS: 0
NAUSEA: 0
SHORTNESS OF BREATH: 0
ABDOMINAL PAIN: 0

## 2024-07-17 NOTE — PATIENT INSTRUCTIONS
Return for APRN, as needed.  Quit smoking.  Call the [place] Tobacco Quit Line [phone]-QUIT-NOW   Stress Echo- hold propranolol day before and morning of test    [place] at the [facility] located on the first floor of [facility].   Enter through hospital main entrance and turn immediately to your left.    Patient contact number:  [phone] (home)      Date/Arrival Time:      Stress Echocardiogram      This records the heart's activity during a cardiac stress test.  A stress echocardiogram is a very effective, noninvasive test that can help determine whether you have blockages in your coronary arteries.    The exam takes approximately thirty minutes.      To help ensure accurate results, patients should take the following steps in preparation for a stress echocardiogram:   Refrain from strenuous activity for 12 hours before the test.   Do not eat, drink, or smoke for two hours prior to the test.  Unless instructed otherwise by your physician, you should continue to take prescribed medications.   Wear loose, comfortable clothing and walking shoes.    If you need to change this appointment, please call [phone] to reschedule.     How to take:  NITROGLYCERIN (Nitrostat) 0.4 mg tablets, sublingual.  Nitroglycerin is in a group of drugs called nitrates. Nitroglycerin dilates (widens) blood vessels, making it easier for blood to flow through them and easier for the heart to pump.    Dosing Guidelines for Nitroglycerin Tablets  At the start of an angina (chest pain) attack, place one tablet under the tongue or between the cheek and gum.  Do not swallow or chew the tablet; let it dissolve on its own.  If necessary, a second and third tablet may be used, with five minutes between using each tablet.  If you use a third tablet and your chest pain continues, it is time to seek immediate medical attention.  Call [phone] immediately and have someone drive you to the emergency room.  You may

## 2024-07-17 NOTE — PROGRESS NOTES
[facility]  [address] [address]  Phone: [phone]  Fax: [phone]    OFFICE VISIT:  2024    Karen Lazaro - : 1985    Reason For Visit:  Karen is a 39 y.o. female who is here for Follow-Up from Hospital (Patient states her chest pain comes and goes and sometimes she feels light headed.)       Diagnosis Orders   1. Other chest pain  Stress Echocardiogram (TTE) exercise (PRN contrast/bubble/strain/3D) order panel      2. Type 2 diabetes mellitus with other specified complication, without long-term current use of insulin (HCC)        3. Primary hypertension        4. Mixed hyperlipidemia        5. Cigarette nicotine dependence with other nicotine-induced disorder        6. Family history of heart disease        7. Situational stress              HPI  Patient was referred to our office after an ER visit for complaints of chest pain on 2024.  Patient history includes diabetes, hyperlipidemia, multiple sclerosis, pancreatitis, seizures, smoking.  Family history of heart disease in her mother    Patient states she has chest pain a few times per week that is exertional in nature.  Other times it is brought on when she is anxious.  She has had radiation of the pain to her neck, back, shoulders, arm.  She states she has a lot of stress in her life at this time.  She states she has chronic pain related to her multiple sclerosis       Manish Venegas MD is PCP.  Karen Lazaro has the following history as recorded in [facility]:    There are no problems to display for this patient.    Past Medical History:   Diagnosis Date    Asthma     Blind right eye     Devic's disease (HCC)     Diabetes mellitus (HCC)     Herpes simplex virus (HSV) infection     Hx of multiple sclerosis (HCC) Dx beginning of Dec    Mental disorder     MS (multiple sclerosis) (HCC)     Seizures (HCC)      Past Surgical History:   Procedure Laterality Date    BREAST REDUCTION SURGERY

## 2024-10-10 ENCOUNTER — APPOINTMENT (OUTPATIENT)
Dept: GENERAL RADIOLOGY | Facility: HOSPITAL | Age: 39
End: 2024-10-10
Payer: MEDICARE

## 2024-10-10 ENCOUNTER — APPOINTMENT (OUTPATIENT)
Dept: CT IMAGING | Facility: HOSPITAL | Age: 39
End: 2024-10-10
Payer: MEDICARE

## 2024-10-10 ENCOUNTER — HOSPITAL ENCOUNTER (EMERGENCY)
Facility: HOSPITAL | Age: 39
Discharge: HOME | End: 2024-10-10
Payer: MEDICARE

## 2024-10-10 VITALS
BODY MASS INDEX: 36.6 KG/M2 | DIASTOLIC BLOOD PRESSURE: 89 MMHG | HEART RATE: 81 BPM | RESPIRATION RATE: 12 BRPM | HEIGHT: 68 IN | OXYGEN SATURATION: 97 % | SYSTOLIC BLOOD PRESSURE: 128 MMHG | WEIGHT: 241.5 LBS | TEMPERATURE: 97.8 F

## 2024-10-10 DIAGNOSIS — R51.9 CHRONIC INTRACTABLE HEADACHE, UNSPECIFIED HEADACHE TYPE: Primary | ICD-10-CM

## 2024-10-10 DIAGNOSIS — Z86.69 HISTORY OF MIGRAINE HEADACHES: ICD-10-CM

## 2024-10-10 DIAGNOSIS — R10.84 GENERALIZED ABDOMINAL PAIN: ICD-10-CM

## 2024-10-10 DIAGNOSIS — R11.0 NAUSEA: ICD-10-CM

## 2024-10-10 DIAGNOSIS — G89.29 CHRONIC INTRACTABLE HEADACHE, UNSPECIFIED HEADACHE TYPE: Primary | ICD-10-CM

## 2024-10-10 LAB
AMPHET+METHAMPHET UR QL: NEGATIVE
AMPHETAMINES UR QL: NEGATIVE
BARBITURATES UR QL SCN: NEGATIVE
BENZODIAZ UR QL SCN: POSITIVE
BILIRUB UR QL STRIP: NEGATIVE
BUPRENORPHINE SERPL-MCNC: NEGATIVE NG/ML
CANNABINOIDS SERPL QL: NEGATIVE
CLARITY UR: CLEAR
COCAINE UR QL: NEGATIVE
COLOR UR: YELLOW
FENTANYL UR-MCNC: NEGATIVE NG/ML
GLUCOSE UR STRIP-MCNC: NEGATIVE MG/DL
HGB UR QL STRIP.AUTO: NEGATIVE
KETONES UR QL STRIP: NEGATIVE
LEUKOCYTE ESTERASE UR QL STRIP.AUTO: NEGATIVE
METHADONE UR QL SCN: NEGATIVE
NITRITE UR QL STRIP: NEGATIVE
OPIATES UR QL: NEGATIVE
OXYCODONE UR QL SCN: NEGATIVE
PCP UR QL SCN: NEGATIVE
PH UR STRIP.AUTO: 6.5 [PH] (ref 5–8)
PROT UR QL STRIP: NEGATIVE
SP GR UR STRIP: <=1.005 (ref 1–1.03)
TRICYCLICS UR QL SCN: NEGATIVE
TROPONIN T SERPL HS-MCNC: <6 NG/L
UROBILINOGEN UR QL STRIP: NORMAL

## 2024-10-10 PROCEDURE — 81003 URINALYSIS AUTO W/O SCOPE: CPT | Performed by: NURSE PRACTITIONER

## 2024-10-10 PROCEDURE — 25010000002 KETOROLAC TROMETHAMINE PER 15 MG: Performed by: NURSE PRACTITIONER

## 2024-10-10 PROCEDURE — 25010000002 DIPHENHYDRAMINE PER 50 MG: Performed by: NURSE PRACTITIONER

## 2024-10-10 PROCEDURE — 80307 DRUG TEST PRSMV CHEM ANLYZR: CPT | Performed by: NURSE PRACTITIONER

## 2024-10-10 PROCEDURE — 71045 X-RAY EXAM CHEST 1 VIEW: CPT

## 2024-10-10 PROCEDURE — 93005 ELECTROCARDIOGRAM TRACING: CPT | Performed by: NURSE PRACTITIONER

## 2024-10-10 PROCEDURE — 84484 ASSAY OF TROPONIN QUANT: CPT | Performed by: NURSE PRACTITIONER

## 2024-10-10 PROCEDURE — 70450 CT HEAD/BRAIN W/O DYE: CPT

## 2024-10-10 PROCEDURE — 93010 ELECTROCARDIOGRAM REPORT: CPT | Performed by: EMERGENCY MEDICINE

## 2024-10-10 PROCEDURE — 99284 EMERGENCY DEPT VISIT MOD MDM: CPT

## 2024-10-10 PROCEDURE — 96374 THER/PROPH/DIAG INJ IV PUSH: CPT

## 2024-10-10 PROCEDURE — 96375 TX/PRO/DX INJ NEW DRUG ADDON: CPT

## 2024-10-10 RX ORDER — ACETAMINOPHEN 500 MG
1000 TABLET ORAL ONCE
Status: COMPLETED | OUTPATIENT
Start: 2024-10-10 | End: 2024-10-10

## 2024-10-10 RX ORDER — INSULIN LISPRO 100 [IU]/ML
INJECTION, SOLUTION INTRAVENOUS; SUBCUTANEOUS
COMMUNITY

## 2024-10-10 RX ORDER — VENLAFAXINE 25 MG/1
25 TABLET ORAL 2 TIMES DAILY
COMMUNITY

## 2024-10-10 RX ORDER — PROCHLORPERAZINE EDISYLATE 5 MG/ML
10 INJECTION INTRAMUSCULAR; INTRAVENOUS EVERY 6 HOURS PRN
Status: DISCONTINUED | OUTPATIENT
Start: 2024-10-10 | End: 2024-10-10 | Stop reason: HOSPADM

## 2024-10-10 RX ORDER — OMEPRAZOLE 40 MG/1
40 CAPSULE, DELAYED RELEASE ORAL DAILY
COMMUNITY

## 2024-10-10 RX ORDER — LORAZEPAM 1 MG/1
1 TABLET ORAL EVERY 8 HOURS PRN
COMMUNITY

## 2024-10-10 RX ORDER — DIPHENHYDRAMINE HYDROCHLORIDE 50 MG/ML
25 INJECTION INTRAMUSCULAR; INTRAVENOUS ONCE
Status: COMPLETED | OUTPATIENT
Start: 2024-10-10 | End: 2024-10-10

## 2024-10-10 RX ORDER — ONDANSETRON 4 MG/1
4 TABLET, FILM COATED ORAL EVERY 6 HOURS PRN
Qty: 10 TABLET | Refills: 0 | Status: SHIPPED | OUTPATIENT
Start: 2024-10-10 | End: 2024-10-20

## 2024-10-10 RX ORDER — KETOROLAC TROMETHAMINE 15 MG/ML
15 INJECTION, SOLUTION INTRAMUSCULAR; INTRAVENOUS ONCE
Status: COMPLETED | OUTPATIENT
Start: 2024-10-10 | End: 2024-10-10

## 2024-10-10 RX ORDER — GABAPENTIN 300 MG/1
300 CAPSULE ORAL 3 TIMES DAILY
COMMUNITY

## 2024-10-10 RX ORDER — SODIUM CHLORIDE 0.9 % (FLUSH) 0.9 %
10 SYRINGE (ML) INJECTION AS NEEDED
Status: DISCONTINUED | OUTPATIENT
Start: 2024-10-10 | End: 2024-10-10 | Stop reason: HOSPADM

## 2024-10-10 RX ADMIN — DIPHENHYDRAMINE HYDROCHLORIDE 25 MG: 50 INJECTION, SOLUTION INTRAMUSCULAR; INTRAVENOUS at 18:52

## 2024-10-10 RX ADMIN — ACETAMINOPHEN 1000 MG: 500 TABLET, FILM COATED ORAL at 18:53

## 2024-10-10 RX ADMIN — KETOROLAC TROMETHAMINE 15 MG: 15 INJECTION, SOLUTION INTRAMUSCULAR; INTRAVENOUS at 18:52

## 2024-10-10 NOTE — ED PROVIDER NOTES
"Subjective   History of Present Illness  39-year-old female patient presents to the ED with multiple complaints.  She complains of a \"migraine headache\" for the past 37 days.  She states that this head pain is similar to all of her previous migraines.  The pain reportedly \"starts in the middle of my forehead and shoots to the back of my head\".  Current pain 10/10.  Reports photophobia.  Denies vision changes, eye pain, dizziness and vomiting.  Patient states that she is prescribed Ubrelvy and a another medication (cannot recall the name) as needed for migraine headaches, per Dr. Chelsea Winter (neurologist).  She is reportedly taking these medications without relief.  Last follow-up visit with Dr. Winter was reportedly one week ago. Reports upcoming visit \"next week\". Reports that she occasionally has chronic migraines that last for many days.  Her last migraine (3 months ago) lasted \"99 days\", requiring \"shots in the back of my head\".  Reports plan is for referral to pain management. Chart review indicates that the patient was seen at [facility] in [place] on August 3 status post a fall.  CT of the head completed at that time was negative.  Patient denies any additional imaging of the head since August 3.     Patient states that she was evaluated [facility] [place] for this head pain, as well as abdominal pain.  She states that she has been experiencing \"all over, but mainly the right side\" abdominal pain for the \"past 2 to 3 weeks\".  Patient denies imaging of her head, but reports imaging of her abdomen and pelvis.  She states that she was diagnosed with \"pancreatitis\".  She reports of a history of pancreatitis with the last episode 6 months ago, requiring [place] [facility]  Denies EtOH use.  Current pain 10/10.  Pain worsens after eating; adds \"when I eat, I get bloated real bad\".  Reports a history of a cholecystectomy in October 2023.  Reports a history of episodic diarrhea " "since gallbladder removal.  Reports nausea, denies vomiting.  Denies constipation, black or bloody stools.  Additional abdominal surgeries include hysterectomy and remote C-sections.    Patient has developed a new complaint [place] [facility] [place] midsternal, nonradiating chest pain.  Again she reports nausea, denies vomiting.  Denies diaphoresis.  Reports SOA, \"when bending over\".               Review of Systems   Constitutional:  Negative for activity change, appetite change, chills, diaphoresis and fever.   Eyes:  Positive for photophobia. Negative for pain and visual disturbance.   Respiratory:  Positive for shortness of breath. Negative for cough, wheezing and stridor.    Cardiovascular:  Positive for chest pain. Negative for palpitations and leg swelling.   Gastrointestinal:  Positive for abdominal distention, abdominal pain, diarrhea and nausea. Negative for anal bleeding, blood in stool, constipation, rectal pain and vomiting.   Genitourinary:  Negative for difficulty urinating, dysuria, flank pain, hematuria, vaginal bleeding and vaginal discharge.   Musculoskeletal:  Negative for neck pain and neck stiffness.   Neurological:  Positive for headaches. Negative for dizziness, syncope, facial asymmetry, speech difficulty, weakness, light-headedness and numbness.   All other systems reviewed and are negative.      Past Medical History:   Diagnosis Date    Devic's disease     Diabetes mellitus        Allergies   Allergen Reactions    Bactrim [Sulfamethoxazole-Trimethoprim] Hives    Iodine Swelling     Blisters      Latex Itching and Swelling    Dexamethasone Unknown (See Comments)     No steroids, pt becomes hypotensive       Past Surgical History:   Procedure Laterality Date     SECTION      x 2    CHOLECYSTECTOMY      TONSILLECTOMY      TUBAL ABDOMINAL LIGATION         Family History   Problem Relation Age of Onset    Diabetes Mother     Diabetes Father     Diabetes Sister     No Known Problems " Maternal Grandmother     No Known Problems Maternal Grandfather     No Known Problems Paternal Grandmother     No Known Problems Paternal Grandfather        Social History     Socioeconomic History    Marital status: Single   Tobacco Use    Smoking status: Every Day     Current packs/day: 1.00     Average packs/day: 1 pack/day for 15.0 years (15.0 ttl pk-yrs)     Types: Cigarettes    Smokeless tobacco: Never   Substance and Sexual Activity    Alcohol use: No    Drug use: No    Sexual activity: Defer           Objective   Physical Exam  Vitals and nursing note reviewed.   Constitutional:       General: She is awake. She is not in acute distress.     Appearance: Normal appearance. She is not ill-appearing, toxic-appearing or diaphoretic.   HENT:      Head: Normocephalic and atraumatic.      Right Ear: Tympanic membrane, ear canal and external ear normal.      Left Ear: Tympanic membrane, ear canal and external ear normal.      Nose: Nose normal. No rhinorrhea.      Mouth/Throat:      Mouth: Mucous membranes are moist.   Eyes:      Extraocular Movements: Extraocular movements intact.      Conjunctiva/sclera: Conjunctivae normal.      Pupils: Pupils are equal, round, and reactive to light.   Cardiovascular:      Rate and Rhythm: Normal rate and regular rhythm.      Pulses: Normal pulses.      Heart sounds: Normal heart sounds. No murmur heard.  Pulmonary:      Effort: Pulmonary effort is normal. No respiratory distress.      Breath sounds: Normal breath sounds. No stridor.   Chest:      Chest wall: No tenderness.   Abdominal:      General: Abdomen is protuberant. Bowel sounds are normal. There is no distension.      Palpations: Abdomen is soft.      Tenderness: generalized  There is no right CVA tenderness or left CVA tenderness.   Musculoskeletal:      Cervical back: Normal range of motion and neck supple. No rigidity or tenderness.      Right lower leg: No swelling or tenderness. No edema.      Left lower leg: No  "swelling or tenderness. No edema.      Comments: Moves all extremities equally and independently   Lymphadenopathy:      Cervical: No cervical adenopathy.   Skin:     General: Skin is warm and dry.      Capillary Refill: Capillary refill takes less than 2 seconds.   Neurological:      General: No focal deficit present.      Mental Status: She is alert and oriented to person, place, and time.      GCS: GCS eye subscore is 4. GCS verbal subscore is 5. GCS motor subscore is 6.      Cranial Nerves: Cranial nerves 2-12 are intact.      Sensory: Sensation is intact. No sensory deficit.      Motor: Motor function is intact.   Psychiatric:         Mood and Affect: Mood normal.         Behavior: Behavior normal. Behavior is cooperative.         Procedures           ED Course  ED Course as of 10/11/24 1138   Thu Oct 10, 2024   1729 PERC Rule for Pulmonary Embolism - MDCalc  0 criteria -> No need for further workup, as <2% chance of PE. If no criteria are positive and clinician's pre-test probability is <15%, PERC Rule criteria are satisfied.    Wells' Criteria for Pulmonary Embolism - MDCalc  0.0 points -> Low risk group: 1.3% chance of PE in an ED population. Another study assigned scores = 4 as \"PE Unlikely\" and had a 3% incidence of PE.    [place] Score (Revised) for Pulmonary Embolism - MDCalc  3 points -> Low risk group: 7-9% incidence of PE from several studies. [TD]   1818 ED attending (Patrick) consulted regarding presentation.  Plan: Okay for CT head. Continue with CP work-up. [place] [facility]  [TD]   1900 ECG 12 Lead Chest Pain  SR 76bpm [TD]   2001 CT Head Without Contrast  IMPRESSION:     1. No acute intracranial abnormality.     This report was signed and finalized on 10/10/2024 6:38 PM by Yvon Jerez.   [TD]   2001 XR Chest 1 View  Dependent interpretation, no acute findings [TD]   2002 Urinalysis With Culture If Indicated - Urine, Clean Catch  All values within normal limits. [TD]   2002 " "Urine Drug Screen - Urine, Clean Catch(!)  Positive for benzodiazepines, which the patient states that she is prescribed Ativan 1 mg once daily. [TD]   2002 High Sensitivity Troponin T  Troponin less than 6, normal. [TD]   2015 XR Chest 1 View  IMPRESSION:     1. No active disease in the chest.   [TD]   2020 ED records obtained from [facility].  Amylase WNL.  CBC with differential WNL.  CMP glucose 147 AST 48, otherwise normal.  Lipase \"abnormal\", no value provided.  Magnesium WNL.  UA glucose present along with a trace of ketones, otherwise negative.  Patient was given a 1 L IV bolus and IV Zofran.  CT of the abdomen and pelvis obtained.   IMPRESSION:  1.  Minimal atelectasis within the lateral aspect of the right lung base.  2.  Mild fatty metamorphosis of the liver is again noted.  No focal hepatic abnormality is identified.  3.  The patient is again noted to be status postcholecystectomy.  4.  A single extremely tiny nonobstructing right nephrolith is again noted.  5.  The patient is again noted to be status post hysterectomy and bilateral salpingo oophorectomy.  6.  Asystole is again noted.  7.  A tiny fat-containing umbilical hernia is again noted.  8.  The exam is otherwise within normal limits for body habitus with no appendicitis or any acute finding identified.    To bedside to discuss results with the patient. Patient reports continued head pain despite meds given. She is requesting \"something stronger\". Advised guidelines do not support narcotics with chronic headaches. Awaiting repeat Troponin.  [TD]   2026 ED attending consulted (Patrick). Patient reports continued head pain despite meds given. She is requesting \"something stronger\". Plan: discharge home with follow-up.  [TD]   2044 RN to bedside to collect repeat Troponin. Patient requests discharge.  [TD]      ED Course User Index  [TD] Kena Foster APRN                                             Medical Decision Making  39-year-old female patient " "presents to the ED with multiple complaints.  She complains of a \"migraine headache\" for the past 37 days.  She states that this head pain is similar to all of her previous migraines.  The pain reportedly \"starts in the middle of my forehead and shoots to the back of my head\".  Current pain 10/10.  Reports photophobia.  Denies vision changes, eye pain, dizziness and vomiting.  Patient states that she is prescribed Ubrelvy and a another medication (cannot recall the name) as needed for migraine headaches, per Dr. Chelsea Winter (neurologist).  She is reportedly taking these medications without relief.  Last follow-up visit with Dr. Winter was reportedly one week ago. Reports upcoming visit \"next week\". Reports that she occasionally has chronic migraines that last for many days.  Her last migraine (3 months ago) lasted \"99 days\", requiring \"shots in the back of my head\".  Reports plan is for referral to pain management. Chart review indicates that the patient was seen at [facility] in [place] on August 3 status post a fall.  CT of the head completed at that time was negative.  Patient denies any additional imaging of the head since August 3.     Patient states that she was evaluated at [facility] ED today for this head pain, as well as abdominal pain.  She states that she has been experiencing \"all over, but mainly the right side\" abdominal pain for the \"past 2 to 3 weeks\".  Patient denies imaging of her head, but reports imaging of her abdomen and pelvis.  She states that she was diagnosed with \"pancreatitis\".  She reports of a history of pancreatitis with the last episode 6 months ago, requiring admission to [facility].  Denies EtOH use.  Current pain 10/10.  Pain worsens after eating; adds \"when I eat, I get bloated real bad\".  Reports a history of a cholecystectomy in October 2023.  Reports a history of episodic diarrhea since gallbladder removal.  Reports nausea, denies vomiting.  Denies " "constipation, black or bloody stools.  Additional abdominal surgeries include hysterectomy and remote C-sections.    Patient has developed a new complaint since discharge from [facility] of midsternal, nonradiating chest pain.  Again she reports nausea, denies vomiting.  Denies diaphoresis.  Reports SOA, \"when bending over\".     DDX to include but not limited to: migraine headache, intractable headache, epidural/subdural hematoma, chronic pain syndrome, pancreatitis, ACS, PE - low risk    Course of TX in the ED:   Labs Reviewed  URINE DRUG SCREEN - Abnormal; Notable for the following components:     Benzodiazepine Screen, Urine   Positive (*)            All other components within normal limits         Narrative: Cutoff For Drugs Screened:                                    Amphetamines               500 ng/ml                  Barbiturates               200 ng/ml                  Benzodiazepines            150 ng/ml                  Cocaine                    150 ng/ml                  Methadone                  200 ng/ml                  Opiates                    100 ng/ml                  Phencyclidine               25 ng/ml                  THC                         50 ng/ml                  Methamphetamine            500 ng/ml                  Tricyclic Antidepressants  300 ng/ml                  Oxycodone                  100 ng/ml                  Buprenorphine               10 ng/ml                                    The normal value for all drugs tested is negative. This report includes unconfirmed screening results, with the cutoff values listed, to be used for medical treatment purposes only.  Unconfirmed results must not be used for non-medical purposes such as employment or legal testing.  Clinical consideration should be applied to any drug of abuse test, particularly when unconfirmed results are used.    URINALYSIS W/ CULTURE IF INDICATED - Normal         Narrative: In absence of clinical symptoms, the " presence of pyuria, bacteria, and/or nitrites on the urinalysis result does not correlate with infection.                  Urine microscopic not indicated.  TROPONIN - Normal         Narrative: High Sensitive Troponin T Reference Range:                  <14.0 ng/L- Negative Female for AMI                  <22.0 ng/L- Negative Male for AMI                  >=14 - Abnormal Female indicating possible myocardial injury.                  >=22 - Abnormal Male indicating possible myocardial injury.                   Clinicians would have to utilize clinical acumen, EKG, Troponin, and serial changes to determine if it is an Acute Myocardial Infarction or myocardial injury due to an underlying chronic condition.                                       FENTANYL, URINE - Normal    Medications  sodium chloride 0.9 % flush 10 mL (has no administration in time range)  prochlorperazine (COMPAZINE) injection 10 mg (has no administration in time range)  diphenhydrAMINE (BENADRYL) injection 25 mg (25 mg Intravenous Given 10/10/24 1852)  ketorolac (TORADOL) injection 15 mg (15 mg Intravenous Given 10/10/24 1852)  acetaminophen (TYLENOL) tablet 1,000 mg (1,000 mg Oral Given 10/10/24 1853)    XR Chest 1 View   Final Result         1. No active disease in the chest.         This report was signed and finalized on 10/10/2024 8:15 PM by Yvon Jerez.          CT Head Without Contrast   Final Result         1. No acute intracranial abnormality.         This report was signed and finalized on 10/10/2024 6:38 PM by Yvon Jerez.       Patient is stable for discharge home and patient is in agreement.  Will discharge home today with close interval outpatient follow-up by PCP and further care as needed. Patient verbalizes understanding of the discharge instructions provided. Patient also verbalized understanding to follow-up as recommended, and to return to the ER if unable to follow-up with worsening or worrisome symptoms.       Problems Addressed:  Chronic intractable headache, unspecified headache type: chronic illness or injury  Generalized abdominal pain: acute illness or injury  History of migraine headaches: chronic illness or injury  Nausea: chronic illness or injury    Amount and/or Complexity of Data Reviewed  External Data Reviewed: labs, radiology and notes.     Details: From [facility] ED visit. Prior clinic and ED notes, as well as recent imaging   Labs: ordered. Decision-making details documented in ED Course.  Radiology: ordered. Decision-making details documented in ED Course.  ECG/medicine tests: ordered and independent interpretation performed. Decision-making details documented in ED Course.    Risk  OTC drugs.  Prescription drug management.        Final diagnoses:   Chronic intractable headache, unspecified headache type   Generalized abdominal pain   Nausea   History of migraine headaches       ED Disposition  ED Disposition       ED Disposition   Discharge    Condition   Stable    Comment   --               Katelynn Reeder, APRN  [address] [facility] [address]  [facility] [id number] [phone]    In 1 day  As needed, if not improving    Chelsea Winter MD  [address] [address] [phone]    In 1 day  As needed, if not improving to discuss your migraine headaches and treatments available    [facility] EMERGENCY DEPARTMENT  [address] [facility] [address] [phone]    As needed, If not improving and sooner if worsening    A medication reconciliation was completed based on the patient's report of medications that have either been discontinued or completed.       Medication List        Changed      ondansetron 4 MG tablet  Commonly known as: ZOFRAN  Take 1 tablet by mouth Every 6 (Six) Hours As Needed for Nausea or Vomiting for up to 10 days.  What changed:   when to take this  reasons to take this            Stop      ketorolac 10 MG tablet  Commonly known as:  TORADOL     oxyCODONE-acetaminophen 7.5-325 MG per tablet  Commonly known as: PERCOCET               Where to Get Your Medications        These medications were sent to [facility] - [place], [place] - [address] [facility] - [phone]  - [phone] [address] [address]      Phone: [phone]   ondansetron 4 MG tablet            Kena Foster, APRN  10/11/24 9552

## 2024-10-11 LAB
QT INTERVAL: 396 MS
QTC INTERVAL: 445 MS

## 2024-10-11 NOTE — DISCHARGE INSTRUCTIONS
It was very nice to meet you. Thank you for allowing us to take care of you today at [facility].     Although a definitive diagnosis is not always found, we feel any emergent causes have been ruled out or stabilized, and your condition is currently stable for discharge.  Based on your exam and results of any diagnostic tests completed, the likely cause of your presenting symptoms are chronic in nature.    Home to rest.  Activity as tolerated.  Continue your current medications as prescribed.  Stay well-hydrated plenty of fluids, which can help treat and prevent headaches.  Is best to avoid caffeine to stay hydrated.  However, some caffeine can help improve headaches.  If you have not been advised to not do so, you may consider this option.  Follow-up with your primary care provider tomorrow if your abdominal pain is continuing.  Follow-up with your neurologist tomorrow if your headache continues.  Return to the ER as needed with worsening or worrisome symptoms.    Today you were seen in the emergency department for your symptoms. Please understand that an ER evaluation is just the start of your evaluation. We do the best we can, but we are often unable to fully find what is causing your symptoms from this single evaluation.  Because of this, the goal is to determine whether you need to be admitted to the hospital or if it is safe for you to go home and follow-up with your other health care providers such as your primary care provider physicians or a specialist on an outpatient basis.      Like we discussed, I strongly urge that you follow up with your primary care provider. Please call their office to set up an appointment as soon as possible so that you can be re-evaluated for your symptoms or for any other questions.  I have provided the information needed, including phone number, to call to set up an appointment below in these discharge papers.      Educational material has also been provided in the  following pages regarding what we have discussed today.      Please return to the emergency room within 12-48 hours if you experience symptoms such as the following:   Fever, chills, chest pain or shortness of breath, pain with inspiration/expiration, pain that travels to your arms, neck or back, nausea, vomiting, severe headache, tearing pain in your chest, dizziness, feel as though you are about to pass out, OR if you have any worsening symptoms, or any other concerns.

## 2024-12-11 RX ORDER — NITROGLYCERIN 0.4 MG/1
TABLET SUBLINGUAL
Qty: 25 TABLET | Refills: 3 | Status: SHIPPED | OUTPATIENT
Start: 2024-12-11